# Patient Record
Sex: MALE | Race: WHITE | NOT HISPANIC OR LATINO | Employment: OTHER | URBAN - METROPOLITAN AREA
[De-identification: names, ages, dates, MRNs, and addresses within clinical notes are randomized per-mention and may not be internally consistent; named-entity substitution may affect disease eponyms.]

---

## 2017-01-10 ENCOUNTER — HOSPITAL ENCOUNTER (OUTPATIENT)
Dept: LAB | Facility: MEDICAL CENTER | Age: 67
End: 2017-01-10
Attending: INTERNAL MEDICINE
Payer: MEDICARE

## 2017-01-10 DIAGNOSIS — N18.3 CKD (CHRONIC KIDNEY DISEASE), STAGE 3 (MODERATE): Chronic | ICD-10-CM

## 2017-01-10 LAB
ANION GAP SERPL CALC-SCNC: 6 MMOL/L (ref 0–11.9)
BUN SERPL-MCNC: 25 MG/DL (ref 8–22)
CALCIUM SERPL-MCNC: 9.3 MG/DL (ref 8.5–10.5)
CHLORIDE SERPL-SCNC: 110 MMOL/L (ref 96–112)
CO2 SERPL-SCNC: 25 MMOL/L (ref 20–33)
CREAT SERPL-MCNC: 1.46 MG/DL (ref 0.5–1.4)
GLUCOSE SERPL-MCNC: 107 MG/DL (ref 65–99)
POTASSIUM SERPL-SCNC: 4.3 MMOL/L (ref 3.6–5.5)
SODIUM SERPL-SCNC: 141 MMOL/L (ref 135–145)

## 2017-01-10 PROCEDURE — 80048 BASIC METABOLIC PNL TOTAL CA: CPT

## 2017-01-10 PROCEDURE — 36415 COLL VENOUS BLD VENIPUNCTURE: CPT

## 2017-01-11 ENCOUNTER — OFFICE VISIT (OUTPATIENT)
Dept: NEPHROLOGY | Facility: MEDICAL CENTER | Age: 67
End: 2017-01-11
Payer: MEDICARE

## 2017-01-11 VITALS
TEMPERATURE: 97.4 F | HEART RATE: 64 BPM | WEIGHT: 220 LBS | HEIGHT: 74 IN | DIASTOLIC BLOOD PRESSURE: 68 MMHG | RESPIRATION RATE: 12 BRPM | BODY MASS INDEX: 28.23 KG/M2 | SYSTOLIC BLOOD PRESSURE: 116 MMHG

## 2017-01-11 DIAGNOSIS — I10 ESSENTIAL HYPERTENSION: Chronic | ICD-10-CM

## 2017-01-11 DIAGNOSIS — N18.3 CKD (CHRONIC KIDNEY DISEASE), STAGE 3 (MODERATE): Chronic | ICD-10-CM

## 2017-01-11 PROCEDURE — 99214 OFFICE O/P EST MOD 30 MIN: CPT | Performed by: INTERNAL MEDICINE

## 2017-01-11 ASSESSMENT — ENCOUNTER SYMPTOMS
FEVER: 0
HYPERTENSION: 1
MYALGIAS: 0
NAUSEA: 0
VOMITING: 0
SHORTNESS OF BREATH: 0
HEADACHES: 0

## 2017-01-11 NOTE — PROGRESS NOTES
"Subjective:      Wilmer Flores is a 66 y.o. male who presents with Hypertension and Chronic Kidney Disease          Hypertension  This is a chronic problem. The current episode started more than 1 year ago. The problem is unchanged. The problem is controlled. Pertinent negatives include no chest pain, headaches, peripheral edema or shortness of breath. There are no associated agents to hypertension. Risk factors for coronary artery disease include male gender, obesity and dyslipidemia. Past treatments include calcium channel blockers. The current treatment provides significant improvement. There are no compliance problems.  Hypertensive end-organ damage includes kidney disease. Identifiable causes of hypertension include chronic renal disease.   Chronic Kidney Disease  This is a chronic problem. The current episode started more than 1 year ago. The problem occurs constantly. The problem has been gradually worsening. Pertinent negatives include no chest pain, fever, headaches, myalgias, nausea, urinary symptoms or vomiting.       Review of Systems   Constitutional: Negative for fever.   Eyes:        Patient is blind   Respiratory: Negative for shortness of breath.    Cardiovascular: Negative for chest pain and leg swelling.   Gastrointestinal: Negative for nausea and vomiting.   Genitourinary: Negative for dysuria, urgency and frequency.   Musculoskeletal: Negative for myalgias.   Neurological: Negative for headaches.          Objective:     /68 mmHg  Pulse 64  Temp(Src) 36.3 °C (97.4 °F) (Temporal)  Resp 12  Ht 1.88 m (6' 2\")  Wt 99.791 kg (220 lb)  BMI 28.23 kg/m2     Physical Exam   Constitutional: He appears well-developed and well-nourished.   HENT:   Head: Atraumatic.   Nose: Nose normal.   Eyes: Right eye exhibits no discharge. Left eye exhibits no discharge. No scleral icterus.   Neck: No JVD present.   Cardiovascular: Normal rate and regular rhythm.  Exam reveals no friction rub.    No murmur " heard.  Pulmonary/Chest: Effort normal. No respiratory distress. He has no wheezes. He has no rales.   Abdominal: Soft.   Musculoskeletal: He exhibits no edema.   Neurological: He is alert.   Patient is blind   Skin: Skin is warm. He is not diaphoretic.   Psychiatric: He has a normal mood and affect. His behavior is normal.   Nursing note and vitals reviewed.              Assessment/Plan:     1. Essential hypertension  Controlled  Continue low-salt diet    2. CKD (chronic kidney disease), stage 3 (moderate)  Stable  Renal dose on medication  Avoid nephrotoxins  Check labs annually

## 2017-01-12 ENCOUNTER — TELEPHONE (OUTPATIENT)
Dept: MEDICAL GROUP | Facility: PHYSICIAN GROUP | Age: 67
End: 2017-01-12

## 2017-01-12 NOTE — TELEPHONE ENCOUNTER
----- Message from Kia Patel D.O. sent at 1/12/2017 10:16 AM PST -----  Renal function improved on labwork

## 2017-01-26 ENCOUNTER — OFFICE VISIT (OUTPATIENT)
Dept: NEUROLOGY | Facility: MEDICAL CENTER | Age: 67
End: 2017-01-26
Payer: MEDICARE

## 2017-01-26 VITALS
HEART RATE: 57 BPM | BODY MASS INDEX: 28.23 KG/M2 | OXYGEN SATURATION: 95 % | SYSTOLIC BLOOD PRESSURE: 110 MMHG | WEIGHT: 220 LBS | RESPIRATION RATE: 16 BRPM | DIASTOLIC BLOOD PRESSURE: 60 MMHG | TEMPERATURE: 97.5 F | HEIGHT: 74 IN

## 2017-01-26 DIAGNOSIS — R56.9 CONVULSIONS, UNSPECIFIED CONVULSION TYPE (HCC): ICD-10-CM

## 2017-01-26 PROCEDURE — G8420 CALC BMI NORM PARAMETERS: HCPCS | Performed by: PSYCHIATRY & NEUROLOGY

## 2017-01-26 PROCEDURE — G8484 FLU IMMUNIZE NO ADMIN: HCPCS | Performed by: PSYCHIATRY & NEUROLOGY

## 2017-01-26 PROCEDURE — 3017F COLORECTAL CA SCREEN DOC REV: CPT | Performed by: PSYCHIATRY & NEUROLOGY

## 2017-01-26 PROCEDURE — 1036F TOBACCO NON-USER: CPT | Performed by: PSYCHIATRY & NEUROLOGY

## 2017-01-26 PROCEDURE — G8432 DEP SCR NOT DOC, RNG: HCPCS | Performed by: PSYCHIATRY & NEUROLOGY

## 2017-01-26 PROCEDURE — 99214 OFFICE O/P EST MOD 30 MIN: CPT | Performed by: PSYCHIATRY & NEUROLOGY

## 2017-01-26 PROCEDURE — 4040F PNEUMOC VAC/ADMIN/RCVD: CPT | Performed by: PSYCHIATRY & NEUROLOGY

## 2017-01-26 PROCEDURE — 1101F PT FALLS ASSESS-DOCD LE1/YR: CPT | Performed by: PSYCHIATRY & NEUROLOGY

## 2017-01-26 RX ORDER — TOPIRAMATE 100 MG/1
100 TABLET, FILM COATED ORAL DAILY
Qty: 90 TAB | Refills: 3 | Status: SHIPPED | OUTPATIENT
Start: 2017-01-26 | End: 2024-02-12

## 2017-01-26 ASSESSMENT — ENCOUNTER SYMPTOMS
TINGLING: 1
TREMORS: 0
FOCAL WEAKNESS: 1
SENSORY CHANGE: 1
LOSS OF CONSCIOUSNESS: 0
BACK PAIN: 1
SEIZURES: 1

## 2017-01-26 NOTE — MR AVS SNAPSHOT
"        Wilmer Flores   2017 3:00 PM   Office Visit   MRN: [id number]    Department:  [facility]   Dept Phone:  [phone]    Description:  Male : 1950   Provider:  Dony Isbell M.D.           Reason for Visit     New Patient seizure      Allergies as of 2017     Allergen Noted Reactions    Food 2013       Allergic to strawberries    Catapres [Clonidine Hcl] 10/05/2011       Demerol 10/05/2011       Morphine 10/05/2011       Sulfa Drugs 10/05/2011         You were diagnosed with     Convulsions, unspecified convulsion type (CMS-[facility])   [[id number]]         Vital Signs     Blood Pressure Pulse Temperature Respirations Height Weight    110/60 mmHg 57 36.4 °C (97.5 °F) 16 1.88 m (6' 2\") 99.791 kg (220 lb)    Body Mass Index Oxygen Saturation Smoking Status             28.23 kg/m2 95% Former Smoker         Basic Information     Date Of Birth Sex Race Ethnicity Preferred Language    1950 Male White Non- English      Your appointments     Mar 20, 2017  3:45 PM   Established Patient with Kia Patel D.O.   [facility]    [address], [address]  [place] [facility] [place] [address] [phone]           You will be receiving a confirmation call a few days before your appointment from our automated call confirmation system.            2018  3:00 PM   Follow Up Visit with Dony Isbell M.D.   [facility] (--)    [address], [address]  [place] [place] [address] [phone]           You will be receiving a confirmation call a few days before your appointment from our automated call confirmation system.              Problem List              ICD-10-CM Priority Class Noted - Resolved    Hypertension (Chronic) I10   10/5/2011 - Present    Hypertriglyceridemia (Chronic) E78.1   10/5/2011 - Present    Neck pain, chronic (Chronic) M54.2, G89.29   10/5/2011 - Present    Chronic lower back pain (Chronic) M54.5, G89.29   " 10/5/2011 - Present    RLS (restless legs syndrome) (Chronic) G25.81   10/5/2011 - Present    CKD (chronic kidney disease) (Chronic) N18.9   10/5/2011 - Present    Blindness H54.0   10/5/2011 - Present    Routine health maintenance Z00.00   10/5/2011 - Present    Vitamin D deficiency E55.9   11/9/2011 - Present    Convulsion (CMS-HCC) R56.9   3/7/2013 - Present    Voice hoarseness R49.0   10/10/2013 - Present    Impaired fasting blood sugar R73.01   12/3/2013 - Present    Chronic nausea R11.0   1/15/2014 - Present    Paresthesias R20.2   2/25/2016 - Present    Melanoma (CMS-HCC) C43.9   6/6/2016 - Present      Health Maintenance        Date Due Completion Dates    IMM DTaP/Tdap/Td Vaccine (1 - Tdap) 1/29/1969 ---    IMM ZOSTER VACCINE 1/29/2010 ---    IMM PNEUMOCOCCAL 65+ (ADULT) LOW/MEDIUM RISK SERIES (1 of 2 - PCV13) 1/29/2015 3/8/2009    IMM INFLUENZA (1) 9/1/2016 ---    COLONOSCOPY 10/23/2019 10/23/2014, 1/3/2014            Current Immunizations     Pneumococcal polysaccharide vaccine (PPSV-23) 3/8/2009      Below and/or attached are the medications your provider expects you to take. Review all of your home medications and newly ordered medications with your provider and/or pharmacist. Follow medication instructions as directed by your provider and/or pharmacist. Please keep your medication list with you and share with your provider. Update the information when medications are discontinued, doses are changed, or new medications (including over-the-counter products) are added; and carry medication information at all times in the event of emergency situations     Allergies:  FOOD - (reactions not documented)     CATAPRES - (reactions not documented)     DEMEROL - (reactions not documented)     MORPHINE - (reactions not documented)     SULFA DRUGS - (reactions not documented)               Medications  Valid as of: January 26, 2017 -  3:35 PM    Generic Name Brand Name Tablet Size Instructions for use    AmLODIPine  Besylate (Tab) NORVASC 10 MG TAKE 1 TABLET BY MOUTH DAILY        Ascorbic Acid (Tab) ascorbic acid 500 MG Take 500 mg by mouth every day.        Aspirin (Chew Tab) ASA 81 MG Take 81 mg by mouth every day.        B Complex-C   Take  by mouth.        Cholecalciferol (Tab) Vitamin D-3 5000 UNITS Take  by mouth every day.        Fenofibrate (Tab) TRICOR 145 MG TAKE 1 TABLET BY MOUTH EVERY DAY        LORazepam (Tab) ATIVAN 0.5 MG Take 1 Tab by mouth every four hours as needed for Anxiety.        Lovastatin (Tab) MEVACOR 40 MG TAKE 1 TABLET BY MOUTH EVERY DAY        Multiple Vitamins-Minerals   Take  by mouth.        Nebivolol HCl (Tab) BYSTOLIC 10 MG TAKE 1 TABLET BY MOUTH EVERY DAY        Omega-3 Fatty Acids (Cap) fish oil 1000 MG Take 1,000 mg by mouth 3 times a day, with meals.        OxyCODONE HCl (Tab) OXY-IR 15 MG Take 30 mg by mouth 2 Times a Day.        OxyCODONE HCl (Tablet Extended Release 12 hour Abuse-Deterrent) OXYCONTIN 60 MG Take 30 mg by mouth 4 times a day.        Pantoprazole Sodium   Take  by mouth.        Pregabalin   Take 150 mg by mouth 4 times a day.        Prochlorperazine Maleate (Tab) COMPAZINE 10 MG Take 10 mg by mouth 2 times a day as needed.        Psyllium   Take  by mouth.        ROPINIRole HCl (Tab) REQUIP 0.5 MG TAKE 1 TABLET BY MOUTH THREE TIMES DAILY        TiaGABine HCl (Tab) GABITRIL 4 MG Take 4 mg by mouth 3 times a day.        TiZANidine HCl (Tab) ZANAFLEX 4 MG Take 1 Tab by mouth 4 times a day.        Topiramate (Tab) TOPAMAX 100 MG Take 1 Tab by mouth every day.        .                 Medicines prescribed today were sent to:     [id number] - [place], [place] - [address] AT [facility] [address]    [address] [place] [address]    Phone: [phone] Fax: [phone]    Open 24 Hours?: No    [facility] - [place], [place] - [address]    [address] [address]    Phone: [phone] Fax:  [phone]    Open 24 Hours?: No      Medication refill instructions:       If your prescription bottle indicates you have medication refills left, it is not necessary to call your provider’s office. Please contact your pharmacy and they will refill your medication.    If your prescription bottle indicates you do not have any refills left, you may request refills at any time through one of the following ways: The online [employer] system (except Urgent Care), by calling your provider’s office, or by asking your pharmacy to contact your provider’s office with a refill request. Medication refills are processed only during regular business hours and may not be available until the next business day. Your provider may request additional information or to have a follow-up visit with you prior to refilling your medication.   *Please Note: Medication refills are assigned a new Rx number when refilled electronically. Your pharmacy may indicate that no refills were authorized even though a new prescription for the same medication is available at the pharmacy. Please request the medicine by name with the pharmacy before contacting your provider for a refill.        Other Notes About Your Plan     Dr Connell -- GI  Dr Steen -- Pain  Dr Varghese -- ENT  Dr Hanna -- Neurology  Dr Najjar -- nephrology           [facility] Status: Patient Declined

## 2017-01-26 NOTE — PROGRESS NOTES
Subjective:      Wilmer Flores is a 66 y.o. male who presents with his wife Leatha, for follow-up, former patient of Dr. Mueller, with a history of nonepileptic seizures and axonal sensorimotor polyneuropathy with symptomatic RLS.     HPI    Seizures: Wilmer continues to have his events once or twice in a month. Typically during the daytime hours, there has been no change in this pattern, though he has noted that he his pain level increases, he is more susceptible. There always the same type of dizziness, sweatiness, altered sensorium, grunting sounds, grading of teeth and bilateral upper extremity tonic posturing. One of the events occurred while he was in the office with Dr. Mueller, he was still awake and alert, could answer questions appropriately. Ambulatory EEG study also failed to demonstrate any epileptiform activity seen in association with episodes of diaphoresis and dizziness with some confusion. He remains on Topamax 25 mg, 4 times a day, with good tolerability. Given his severe visual disability, his wife is responsible for his medications.    Neuropathy: He did undergo EMG/NCV studies of the lower extremities, these demonstrated an axonal sensorimotor polyneuropathy, there was no clear demonstration of an active radiculopathy. He still has the numbness and tingling in the feet, still below the ankles. He describes discomfort, he also is now working with a foot drop, symptoms more on the left side. He has some loss of sensation in the hands, but none of the painful sensory distortions. Things seem to have moved upwards and become more intense slowly and steadily. He has been placed on Lyrica 150 mg, 4 times a day, already on Gabitril 4 mg, 3 times a day, and OxyContin. Zanaflex has helped to a degree with the spasms themselves.    RLS: Symptomatic of the neuropathy, he is on Requip 0.5 mg, 3 times a day, with good tolerability and efficacy.    Medical, surgical and family histories are reviewed, there are no  "new drug allergies. He is on a long list of medications, from my standpoint including his Topamax, Requip, Lyrica, Gabitril, Zanaflex, oxycodone, and also Norvasc, Mevacor, Tri-Chlor, by systolic, baby aspirin daily, Ativan, and a list of vitamin and mineral supplements.    Review of Systems   Constitutional: Negative for malaise/fatigue.   Musculoskeletal: Positive for back pain.   Neurological: Positive for tingling, sensory change, focal weakness and seizures. Negative for tremors and loss of consciousness.   All other systems reviewed and are negative.       Objective:     /60 mmHg  Pulse 57  Temp(Src) 36.4 °C (97.5 °F)  Resp 16  Ht 1.88 m (6' 2\")  Wt 99.791 kg (220 lb)  BMI 28.23 kg/m2  SpO2 95%     Physical Exam    He appears in no acute distress. His vital signs are stable. There is no malar rash. Both corneas are opacified, the right eye externally deviated. Dentition is poor but there is no active periodontal disease. The neck is supple. Carotid pulses are present without asymmetry. There are some mild chronic vascular insufficiency changes in the distal lower extremities, there is only minimal pretibial edema.    He is fully oriented, there is no evidence of gross language or cognitive deficit. There is no facial asymmetry, sensory loss across the midline, the tongue and uvula are midline. Strength assessment does reveal some weakness of grasp, both plantar flexion and dorsiflexion is well in both feet, the left more than the right. Knee movement as well as hip stabilizers are intact. There is no weakness in the proximal upper extremity. Reflexes are present at the knees and ankles as well as the biceps without asymmetries. The toes are downgoing. Repetitive movements are slowed in the feet, proportionate to the degree of weakness. These are intact with the hands. Sensory exam does reveal a stocking loss of vibration to the shins bilaterally, graded loss of temperature above the ankles " bilaterally. These modalities are intact in the upper extremities.     Assessment/Plan:     1. Convulsions, unspecified convulsion type (CMS-HCC)  Stable, Topamax will be continued but I will make it easier, 100 mg every morning. We reviewed the nature of the type of seizures that he has (nonepileptic), how they can differ from epileptic seizures and thus why they are treated differently, why elevated pain levels may in fact trigger them, and that they will not necessarily worsen, are not indicative of any intrinsic structural pathology in the brain. He will need low-dose medication for the rest of his life.    - topiramate (TOPAMAX) 100 MG Tab; Take 1 Tab by mouth every day.  Dispense: 90 Tab; Refill: 3    2. Neuropathy  Stable on a constellation/cocktail of medication through his pain specialist office including Lyrica, Gabitril, narcotics and Zanaflex.    3. RLS  Symptomatic of the neuropathy, he will need ropinirole lifelong, hopefully we will not see augmentation in which symptoms worsen earlier in the day, and also an increase in severity with not just the legs but now the entire body and upper extremities getting involved.    Face-to-face time was spent reviewing all the above. I can follow up within one year.    Time: Evaluation of 40 minutes for exam, review, discussion, and education  Discussion: As mentioned in the assessment, over 60% of the time spent face-to-face with the patient and his wife counseling and coordinating care.

## 2017-02-01 RX ORDER — NEBIVOLOL HYDROCHLORIDE 10 MG/1
TABLET ORAL
Qty: 90 TAB | Refills: 0 | Status: SHIPPED | OUTPATIENT
Start: 2017-02-01 | End: 2017-03-30

## 2017-03-15 RX ORDER — FENOFIBRATE 145 MG/1
TABLET, COATED ORAL
Qty: 90 TAB | Refills: 0 | Status: SHIPPED | OUTPATIENT
Start: 2017-03-15 | End: 2017-06-15 | Stop reason: SDUPTHER

## 2017-03-27 ENCOUNTER — HOSPITAL ENCOUNTER (OUTPATIENT)
Dept: LAB | Facility: MEDICAL CENTER | Age: 67
End: 2017-03-27
Attending: INTERNAL MEDICINE
Payer: MEDICARE

## 2017-03-27 DIAGNOSIS — R73.01 IMPAIRED FASTING BLOOD SUGAR: ICD-10-CM

## 2017-03-27 DIAGNOSIS — E55.9 VITAMIN D DEFICIENCY: ICD-10-CM

## 2017-03-27 DIAGNOSIS — N18.3 CKD (CHRONIC KIDNEY DISEASE), STAGE 3 (MODERATE): Chronic | ICD-10-CM

## 2017-03-27 LAB
25(OH)D3 SERPL-MCNC: 26 NG/ML (ref 30–100)
ALBUMIN SERPL BCP-MCNC: 4.4 G/DL (ref 3.2–4.9)
ALBUMIN/GLOB SERPL: 1.1 G/DL
ALP SERPL-CCNC: 35 U/L (ref 30–99)
ALT SERPL-CCNC: 15 U/L (ref 2–50)
ANION GAP SERPL CALC-SCNC: 8 MMOL/L (ref 0–11.9)
AST SERPL-CCNC: 16 U/L (ref 12–45)
BILIRUB SERPL-MCNC: 0.6 MG/DL (ref 0.1–1.5)
BUN SERPL-MCNC: 23 MG/DL (ref 8–22)
CALCIUM SERPL-MCNC: 10 MG/DL (ref 8.5–10.5)
CHLORIDE SERPL-SCNC: 106 MMOL/L (ref 96–112)
CO2 SERPL-SCNC: 25 MMOL/L (ref 20–33)
CREAT SERPL-MCNC: 1.68 MG/DL (ref 0.5–1.4)
EST. AVERAGE GLUCOSE BLD GHB EST-MCNC: 123 MG/DL
GLOBULIN SER CALC-MCNC: 3.9 G/DL (ref 1.9–3.5)
GLUCOSE SERPL-MCNC: 132 MG/DL (ref 65–99)
HBA1C MFR BLD: 5.9 % (ref 0–5.6)
POTASSIUM SERPL-SCNC: 4.2 MMOL/L (ref 3.6–5.5)
PROT SERPL-MCNC: 8.3 G/DL (ref 6–8.2)
SODIUM SERPL-SCNC: 139 MMOL/L (ref 135–145)

## 2017-03-27 PROCEDURE — 83036 HEMOGLOBIN GLYCOSYLATED A1C: CPT | Mod: GA

## 2017-03-27 PROCEDURE — 80053 COMPREHEN METABOLIC PANEL: CPT

## 2017-03-27 PROCEDURE — 36415 COLL VENOUS BLD VENIPUNCTURE: CPT | Mod: GA

## 2017-03-27 PROCEDURE — 82306 VITAMIN D 25 HYDROXY: CPT

## 2017-03-30 ENCOUNTER — OFFICE VISIT (OUTPATIENT)
Dept: MEDICAL GROUP | Facility: PHYSICIAN GROUP | Age: 67
End: 2017-03-30
Payer: MEDICARE

## 2017-03-30 VITALS
OXYGEN SATURATION: 91 % | TEMPERATURE: 96.7 F | DIASTOLIC BLOOD PRESSURE: 60 MMHG | HEIGHT: 74 IN | HEART RATE: 56 BPM | RESPIRATION RATE: 16 BRPM | SYSTOLIC BLOOD PRESSURE: 100 MMHG | BODY MASS INDEX: 28.23 KG/M2 | WEIGHT: 220 LBS

## 2017-03-30 DIAGNOSIS — N18.3 CKD (CHRONIC KIDNEY DISEASE), STAGE 3 (MODERATE): Chronic | ICD-10-CM

## 2017-03-30 DIAGNOSIS — R73.01 IMPAIRED FASTING BLOOD SUGAR: ICD-10-CM

## 2017-03-30 DIAGNOSIS — R56.9 CONVULSIONS, UNSPECIFIED CONVULSION TYPE (HCC): ICD-10-CM

## 2017-03-30 DIAGNOSIS — I10 ESSENTIAL HYPERTENSION: Chronic | ICD-10-CM

## 2017-03-30 PROCEDURE — G8419 CALC BMI OUT NRM PARAM NOF/U: HCPCS | Performed by: FAMILY MEDICINE

## 2017-03-30 PROCEDURE — 1101F PT FALLS ASSESS-DOCD LE1/YR: CPT | Performed by: FAMILY MEDICINE

## 2017-03-30 PROCEDURE — 4040F PNEUMOC VAC/ADMIN/RCVD: CPT | Performed by: FAMILY MEDICINE

## 2017-03-30 PROCEDURE — G8484 FLU IMMUNIZE NO ADMIN: HCPCS | Performed by: FAMILY MEDICINE

## 2017-03-30 PROCEDURE — G8432 DEP SCR NOT DOC, RNG: HCPCS | Performed by: FAMILY MEDICINE

## 2017-03-30 PROCEDURE — 1036F TOBACCO NON-USER: CPT | Performed by: FAMILY MEDICINE

## 2017-03-30 PROCEDURE — 99214 OFFICE O/P EST MOD 30 MIN: CPT | Performed by: FAMILY MEDICINE

## 2017-03-30 NOTE — PROGRESS NOTES
Subjective:     Chief Complaint   Patient presents with   • Results       Wilmer Flores is a 67 y.o. male here today for follow up on HTN, IFG, and CKD    Elevated A1C: Pt reports change in diet recently.  Pt states he gained 15lbs, but is now loosing weight.     Pt is taking ativan for onset of prodrome to seizure.  Pt reports seizures with extreme pain.     Hypertension: Patient is taking all medications as directed without side effects. Denies headaches, chest pain, change in urination or lower extremity swelling.      Blindness,   Allergies   Allergen Reactions   • Food      Allergic to strawberries   • Catapres [Clonidine Hcl]    • Demerol    • Morphine    • Sulfa Drugs      Current medicines (including changes today)  Current Outpatient Prescriptions   Medication Sig Dispense Refill   • metoprolol (LOPRESSOR) 25 MG Tab Take 1 Tab by mouth 2 times a day. 60 Tab 3   • fenofibrate (TRICOR) 145 MG Tab TAKE 1 TABLET BY MOUTH EVERY DAY 90 Tab 0   • PANTOPRAZOLE SODIUM PO Take  by mouth.     • topiramate (TOPAMAX) 100 MG Tab Take 1 Tab by mouth every day. 90 Tab 3   • amlodipine (NORVASC) 10 MG Tab TAKE 1 TABLET BY MOUTH DAILY 90 Tab 3   • lovastatin (MEVACOR) 40 MG tablet TAKE 1 TABLET BY MOUTH EVERY DAY 90 Tab 3   • ropinirole (REQUIP) 0.5 MG Tab TAKE 1 TABLET BY MOUTH THREE TIMES DAILY 270 Tab 3   • Omega-3 Fatty Acids (FISH OIL) 1000 MG Cap capsule Take 1,000 mg by mouth 3 times a day, with meals.     • B Complex-C (SUPER B COMPLEX PO) Take  by mouth.     • ascorbic acid (ASCORBIC ACID) 500 MG Tab Take 500 mg by mouth every day.     • aspirin (ASA) 81 MG Chew Tab chewable tablet Take 81 mg by mouth every day.     • tiagabine (GABITRIL) 4 MG TABS Take 4 mg by mouth 3 times a day.     • oxyCODONE HCl CR (OXYCONTIN) 60 MG T12A tablet Take 30 mg by mouth 4 times a day.     • Cholecalciferol (VITAMIN D-3) 5000 UNITS TABS Take  by mouth every day.     • Multiple Vitamins-Minerals (MULTIVITAMIN PO) Take  by mouth.      • Pregabalin (LYRICA PO) Take 150 mg by mouth 4 times a day.     • lorazepam (ATIVAN) 0.5 MG TABS Take 1 Tab by mouth every four hours as needed for Anxiety. (Patient taking differently: Take 0.5 mg by mouth 1 time daily as needed for Anxiety.) 20 Each 0   • oxycodone (OXY-IR) 15 MG immediate release tablet Take 30 mg by mouth 2 Times a Day.     • Psyllium (METAMUCIL PO) Take  by mouth.     • prochlorperazine (COMPAZINE) 10 MG TABS Take 10 mg by mouth 2 times a day as needed.       No current facility-administered medications for this visit.     Social History   Substance Use Topics   • Smoking status: Former Smoker -- 1.00 packs/day for 10 years     Types: Cigarettes     Quit date: 2009   • Smokeless tobacco: Former User     Quit date: 2003      Comment: quit long time ago /used to smoke pack a day for 10 years   • Alcohol Use: No     Family Status   Relation Status Death Age   • Father     • Brother Alive    • Mother       Family History   Problem Relation Age of Onset   • Diabetes Father    • Stroke Father    • Cancer Brother      pancreatic cacner   • Cancer Mother      ovarian     He    has a past medical history of Hypertension (10/5/2011); Hyperlipidemia (10/5/2011); Hypertriglyceridemia (10/5/2011); RLS (restless legs syndrome) (10/5/2011); Blindness - both eyes (10/5/2011); Unspecified urinary incontinence; Arthritis; CATARACT; Glaucoma; Seizure (CMS-HCC); Cancer (CMS-HCC); CKD (chronic kidney disease) (10/5/2011); Kidney disease; Other specified disorder of intestines; Pneumonia; Neck pain, chronic (10/5/2011); Chronic lower back pain (10/5/2011); Pain (1/10/14); and Melanoma in situ of neck (CMS-HCC).        ROS   GEN: no weight loss, fevers, or chills  HEENT:bilat blindness, no ear pain, no difficulty swallowing, no throat pain, no runny nose, no nasal congestion  Resp: no shortness of breath, no cough  CV: no racing heart, no irregular beats, no chest pain  Abd: no nausea,  "no vomiting, no diarrhea, no constipation, no blood in stool, no dark stools, no incontinence  : no dysuria, no hematuria, no urinary incontinence, no increased frequency  MSK: chronic neck and low back pain  Neuro: no headaches, no dizziness, no LOC, no weakness, no numbness/tingling       Objective:     Blood pressure 100/60, pulse 56, temperature 35.9 °C (96.7 °F), resp. rate 16, height 1.88 m (6' 2.02\"), weight 99.791 kg (220 lb), SpO2 91 %. Body mass index is 28.23 kg/(m^2).     Physical Exam:  Constitutional: Alert, no distress.  Skin: Warm, dry, good turgor, no rashes in visible areas.  ENMT: Lips without lesions, good dentition, oropharynx non-erythematous, no exudate, moist oral mucosa  Neck: Trachea midline, no masses, no thyromegaly. No cervical or supraclavicular lymphadenopathy. Full ROM  Respiratory: Unlabored respiratory effort, good air movement, lungs clear to auscultation, no wheezes, no ronchi.  Cardiovascular:RRR, +S1, S2, no murmur, no peripheral edema, pedal and radial pulses equal and intact bilat  Abdomen: Soft, non-tender, no masses, no hepatosplenomegaly.  Psych: Alert and oriented x3, appropriate affect and mood.  Neuro: CN2-12 intact, no gross motor or sensory deficits      Assessment and Plan:   The following treatment plan was discussed    1. Impaired fasting blood sugar  A1C 5.9. We advise to reduce sugar/carbohydrate/alcohol, loose weight, eat more vegetables and lean meats such as fish/chicken/turkey. We also recommend 30 minutes of cardiovascular exercise 5 days of the week. Improved from 6.2 in Nov.     2. Essential hypertension  Chronic: well controlled  Pt requests change in medication d/t cost of [facility].   Patient will take blood pressures daily. If blood pressures are consistently greater than 140/90 patient is to  to call us immediately. If  blood pressures are greater than 180/100 pt to go to ER immediately.  - metoprolol (LOPRESSOR) 25 MG Tab; Take 1 Tab by mouth 2 times " a day.  Dispense: 60 Tab; Refill: 3    3. CKD (chronic kidney disease), stage 3 (moderate)  Chronic: BUN, Cr, and GFR stable.  Will continue to follow. Glucose and HTN controlled    4. Convulsions, unspecified convulsion type (CMS-HCC)  No recent seizure activity.       Followup: Return in about 3 months (around 6/30/2017) for F/U HTN.    Please note that this dictation was created using voice recognition software. I have made every reasonable attempt to correct obvious errors, but I expect that there are errors of grammar and possibly content that I did not discover before finalizing the note.

## 2017-03-30 NOTE — MR AVS SNAPSHOT
"        Wilmer Flores   3/30/2017 3:00 PM   Office Visit   MRN: [id number]    Department:  [facility]   Dept Phone:  [phone]    Description:  Male : 1950   Provider:  Caitlin Soni D.O.           Reason for Visit     Results           Allergies as of 3/30/2017     Allergen Noted Reactions    Food 2013       Allergic to strawberries    Catapres [Clonidine Hcl] 10/05/2011       Demerol 10/05/2011       Morphine 10/05/2011       Sulfa Drugs 10/05/2011         You were diagnosed with     Impaired fasting blood sugar   [198864]       CKD (chronic kidney disease), stage 3 (moderate)   [3741998]       Essential hypertension   [1223293]         Vital Signs     Blood Pressure Pulse Temperature Respirations Height Weight    100/60 mmHg 56 35.9 °C (96.7 °F) 16 1.88 m (6' 2.02\") 99.791 kg (220 lb)    Body Mass Index Oxygen Saturation Smoking Status             28.23 kg/m2 91% Former Smoker         Basic Information     Date Of Birth Sex Race Ethnicity Preferred Language    1950 Male White Non- English      Your appointments     2018  3:00 PM   Follow Up Visit with Dony Isbell M.D.   [facility] (--)    [address] [place] [address], [address]  [facility] [address] [phone]           You will be receiving a confirmation call a few days before your appointment from our automated call confirmation system.              Problem List              ICD-10-CM Priority Class Noted - Resolved    Hypertension (Chronic) I10   10/5/2011 - Present    Hypertriglyceridemia (Chronic) E78.1   10/5/2011 - Present    Neck pain, chronic (Chronic) M54.2, G89.29   10/5/2011 - Present    Chronic lower back pain (Chronic) M54.5, G89.29   10/5/2011 - Present    RLS (restless legs syndrome) (Chronic) G25.81   10/5/2011 - Present    CKD (chronic kidney disease) (Chronic) N18.9   10/5/2011 - Present    Blindness H54.0   10/5/2011 - Present    Routine health maintenance Z00.00   10/5/2011 - " Present    Vitamin D deficiency E55.9   11/9/2011 - Present    Convulsion (CMS-HCC) R56.9   3/7/2013 - Present    Voice hoarseness R49.0   10/10/2013 - Present    Impaired fasting blood sugar R73.01   12/3/2013 - Present    Chronic nausea R11.0   1/15/2014 - Present    Paresthesias R20.2   2/25/2016 - Present    Melanoma (CMS-HCC) C43.9   6/6/2016 - Present      Health Maintenance        Date Due Completion Dates    IMM DTaP/Tdap/Td Vaccine (1 - Tdap) 1/29/1969 ---    IMM ZOSTER VACCINE 1/29/2010 ---    IMM PNEUMOCOCCAL 65+ (ADULT) LOW/MEDIUM RISK SERIES (1 of 2 - PCV13) 1/29/2015 3/8/2009    IMM INFLUENZA (1) 9/1/2016 ---    COLONOSCOPY 10/23/2019 10/23/2014, 1/3/2014            Current Immunizations     Pneumococcal polysaccharide vaccine (PPSV-23) 3/8/2009      Below and/or attached are the medications your provider expects you to take. Review all of your home medications and newly ordered medications with your provider and/or pharmacist. Follow medication instructions as directed by your provider and/or pharmacist. Please keep your medication list with you and share with your provider. Update the information when medications are discontinued, doses are changed, or new medications (including over-the-counter products) are added; and carry medication information at all times in the event of emergency situations     Allergies:  FOOD - (reactions not documented)     CATAPRES - (reactions not documented)     DEMEROL - (reactions not documented)     MORPHINE - (reactions not documented)     SULFA DRUGS - (reactions not documented)               Medications  Valid as of: March 30, 2017 -  4:24 PM    Generic Name Brand Name Tablet Size Instructions for use    AmLODIPine Besylate (Tab) NORVASC 10 MG TAKE 1 TABLET BY MOUTH DAILY        Ascorbic Acid (Tab) ascorbic acid 500 MG Take 500 mg by mouth every day.        Aspirin (Chew Tab) ASA 81 MG Take 81 mg by mouth every day.        B Complex-C   Take  by mouth.         Cholecalciferol (Tab) Vitamin D-3 5000 UNITS Take  by mouth every day.        Fenofibrate (Tab) TRICOR 145 MG TAKE 1 TABLET BY MOUTH EVERY DAY        LORazepam (Tab) ATIVAN 0.5 MG Take 1 Tab by mouth every four hours as needed for Anxiety.        Lovastatin (Tab) MEVACOR 40 MG TAKE 1 TABLET BY MOUTH EVERY DAY        Metoprolol Tartrate (Tab) LOPRESSOR 25 MG Take 1 Tab by mouth 2 times a day.        Multiple Vitamins-Minerals   Take  by mouth.        Omega-3 Fatty Acids (Cap) fish oil 1000 MG Take 1,000 mg by mouth 3 times a day, with meals.        OxyCODONE HCl (Tab) OXY-IR 15 MG Take 30 mg by mouth 2 Times a Day.        OxyCODONE HCl (Tablet Extended Release 12 hour Abuse-Deterrent) OXYCONTIN 60 MG Take 30 mg by mouth 4 times a day.        Pantoprazole Sodium   Take  by mouth.        Pregabalin   Take 150 mg by mouth 4 times a day.        Prochlorperazine Maleate (Tab) COMPAZINE 10 MG Take 10 mg by mouth 2 times a day as needed.        Psyllium   Take  by mouth.        ROPINIRole HCl (Tab) REQUIP 0.5 MG TAKE 1 TABLET BY MOUTH THREE TIMES DAILY        TiaGABine HCl (Tab) GABITRIL 4 MG Take 4 mg by mouth 3 times a day.        Topiramate (Tab) TOPAMAX 100 MG Take 1 Tab by mouth every day.        .                 Medicines prescribed today were sent to:     [facility] [id number] [place] [address] - [address] AT [facility] OF [facility]. & [place] [id number] [facility] [address]    Phone: [phone] Fax: [phone]    Open 24 Hours?: No    [facility] - [place], [place] - [address] [facility]    [address] [facility] [address]    Phone: [phone] Fax: [phone]    Open 24 Hours?: No      Medication refill instructions:       If your prescription bottle indicates you have medication refills left, it is not necessary to call your provider’s office. Please contact your pharmacy and they will refill your medication.    If your prescription bottle indicates you do not have  any refills left, you may request refills at any time through one of the following ways: The online [employer] system (except Urgent Care), by calling your provider’s office, or by asking your pharmacy to contact your provider’s office with a refill request. Medication refills are processed only during regular business hours and may not be available until the next business day. Your provider may request additional information or to have a follow-up visit with you prior to refilling your medication.   *Please Note: Medication refills are assigned a new Rx number when refilled electronically. Your pharmacy may indicate that no refills were authorized even though a new prescription for the same medication is available at the pharmacy. Please request the medicine by name with the pharmacy before contacting your provider for a refill.        Other Notes About Your Plan     Dr Connell -- GI  Dr Steen -- Pain  Dr Varghese -- ENT  Dr Hanna -- Neurology  Dr Najjar -- nephrology           [facility] Status: Patient Declined

## 2017-04-13 ENCOUNTER — TELEPHONE (OUTPATIENT)
Dept: MEDICAL GROUP | Facility: PHYSICIAN GROUP | Age: 67
End: 2017-04-13

## 2017-04-28 ENCOUNTER — OFFICE VISIT (OUTPATIENT)
Dept: URGENT CARE | Facility: PHYSICIAN GROUP | Age: 67
End: 2017-04-28
Payer: MEDICARE

## 2017-04-28 VITALS
RESPIRATION RATE: 14 BRPM | BODY MASS INDEX: 27.34 KG/M2 | SYSTOLIC BLOOD PRESSURE: 108 MMHG | OXYGEN SATURATION: 94 % | HEART RATE: 84 BPM | TEMPERATURE: 98.6 F | WEIGHT: 213 LBS | DIASTOLIC BLOOD PRESSURE: 64 MMHG

## 2017-04-28 DIAGNOSIS — L03.032 CELLULITIS OF TOE OF LEFT FOOT: ICD-10-CM

## 2017-04-28 PROCEDURE — 99214 OFFICE O/P EST MOD 30 MIN: CPT | Performed by: FAMILY MEDICINE

## 2017-04-28 PROCEDURE — G8419 CALC BMI OUT NRM PARAM NOF/U: HCPCS | Performed by: FAMILY MEDICINE

## 2017-04-28 PROCEDURE — 1036F TOBACCO NON-USER: CPT | Performed by: FAMILY MEDICINE

## 2017-04-28 PROCEDURE — 1101F PT FALLS ASSESS-DOCD LE1/YR: CPT | Performed by: FAMILY MEDICINE

## 2017-04-28 PROCEDURE — G8432 DEP SCR NOT DOC, RNG: HCPCS | Performed by: FAMILY MEDICINE

## 2017-04-28 PROCEDURE — 4040F PNEUMOC VAC/ADMIN/RCVD: CPT | Performed by: FAMILY MEDICINE

## 2017-04-28 RX ORDER — DOXYCYCLINE HYCLATE 100 MG
100 TABLET ORAL 2 TIMES DAILY
Qty: 20 TAB | Refills: 0 | Status: SHIPPED | OUTPATIENT
Start: 2017-04-28 | End: 2017-05-08

## 2017-04-28 NOTE — MR AVS SNAPSHOT
Wilmer Camacholey   2017 5:45 PM   Office Visit   MRN: [id number]    Department:  [facility]   Dept Phone:  [phone]    Description:  Male : 1950   Provider:  Alex Gurrola M.D.           Reason for Visit     Toe Pain poss L infected toe x2 days      Allergies as of 2017     Allergen Noted Reactions    Food 2013       Allergic to strawberries    Catapres [Clonidine Hcl] 10/05/2011       Demerol 10/05/2011       Morphine 10/05/2011       Sulfa Drugs 10/05/2011         You were diagnosed with     Cellulitis of toe of left foot   [606651]         Vital Signs     Blood Pressure Pulse Temperature Respirations Weight Oxygen Saturation    108/64 mmHg 84 37 °C (98.6 °F) 14 96.616 kg (213 lb) 94%    Smoking Status                   Former Smoker           Basic Information     Date Of Birth Sex Race Ethnicity Preferred Language    1950 Male White Non- English      Your appointments     May 26, 2017  2:00 PM   ANNUAL EXAM PREVENTATIVE with DENNIS Pearson   [facility])    [address] [place] [id number] [phone]            2018  3:00 PM   Follow Up Visit with Dony Isbell M.D.   [facility] (--)    [address], [address]  [facility] [address] [phone]           You will be receiving a confirmation call a few days before your appointment from our automated call confirmation system.              Problem List              ICD-10-CM Priority Class Noted - Resolved    Hypertension (Chronic) I10   10/5/2011 - Present    Hypertriglyceridemia (Chronic) E78.1   10/5/2011 - Present    Neck pain, chronic (Chronic) M54.2, G89.29   10/5/2011 - Present    Chronic lower back pain (Chronic) M54.5, G89.29   10/5/2011 - Present    RLS (restless legs syndrome) (Chronic) G25.81   10/5/2011 - Present    CKD (chronic kidney disease) (Chronic) N18.9   10/5/2011 - Present    Blindness H54.0    10/5/2011 - Present    Vitamin D deficiency E55.9   11/9/2011 - Present    Convulsion (CMS-HCC) R56.9   3/7/2013 - Present    Voice hoarseness R49.0   10/10/2013 - Present    Impaired fasting blood sugar R73.01   12/3/2013 - Present    Chronic nausea R11.0   1/15/2014 - Present    Paresthesias R20.2   2/25/2016 - Present    Melanoma (CMS-HCC) C43.9   6/6/2016 - Present      Health Maintenance        Date Due Completion Dates    IMM DTaP/Tdap/Td Vaccine (1 - Tdap) 1/29/1969 ---    IMM ZOSTER VACCINE 1/29/2010 ---    IMM PNEUMOCOCCAL 65+ (ADULT) LOW/MEDIUM RISK SERIES (1 of 2 - PCV13) 1/29/2015 3/8/2009    COLONOSCOPY 10/23/2019 10/23/2014, 1/3/2014            Current Immunizations     Pneumococcal polysaccharide vaccine (PPSV-23) 3/8/2009      Below and/or attached are the medications your provider expects you to take. Review all of your home medications and newly ordered medications with your provider and/or pharmacist. Follow medication instructions as directed by your provider and/or pharmacist. Please keep your medication list with you and share with your provider. Update the information when medications are discontinued, doses are changed, or new medications (including over-the-counter products) are added; and carry medication information at all times in the event of emergency situations     Allergies:  FOOD - (reactions not documented)     CATAPRES - (reactions not documented)     DEMEROL - (reactions not documented)     MORPHINE - (reactions not documented)     SULFA DRUGS - (reactions not documented)               Medications  Valid as of: April 28, 2017 -  6:14 PM    Generic Name Brand Name Tablet Size Instructions for use    AmLODIPine Besylate (Tab) NORVASC 10 MG TAKE 1 TABLET BY MOUTH DAILY        Ascorbic Acid (Tab) ascorbic acid 500 MG Take 500 mg by mouth every day.        Aspirin (Chew Tab) ASA 81 MG Take 81 mg by mouth every day.        B Complex-C   Take  by mouth.        Cholecalciferol (Tab)  Vitamin D-3 5000 UNITS Take  by mouth every day.        Doxycycline Hyclate (Tab) VIBRAMYCIN 100 MG Take 1 Tab by mouth 2 times a day for 10 days.        Fenofibrate (Tab) TRICOR 145 MG TAKE 1 TABLET BY MOUTH EVERY DAY        LORazepam (Tab) ATIVAN 0.5 MG Take 1 Tab by mouth every four hours as needed for Anxiety.        Lovastatin (Tab) MEVACOR 40 MG TAKE 1 TABLET BY MOUTH EVERY DAY        Metoprolol Tartrate (Tab) LOPRESSOR 25 MG Take 1 Tab by mouth 2 times a day.        Multiple Vitamins-Minerals   Take  by mouth.        Omega-3 Fatty Acids (Cap) fish oil 1000 MG Take 1,000 mg by mouth 3 times a day, with meals.        OxyCODONE HCl (Tab) OXY-IR 15 MG Take 30 mg by mouth 2 Times a Day.        OxyCODONE HCl (Tablet Extended Release 12 hour Abuse-Deterrent) OXYCONTIN 60 MG Take 30 mg by mouth 4 times a day.        Pantoprazole Sodium   Take  by mouth.        Pregabalin   Take 150 mg by mouth 4 times a day.        Prochlorperazine Maleate (Tab) COMPAZINE 10 MG Take 10 mg by mouth 2 times a day as needed.        Psyllium   Take  by mouth.        ROPINIRole HCl (Tab) REQUIP 0.5 MG TAKE 1 TABLET BY MOUTH THREE TIMES DAILY        TiaGABine HCl (Tab) GABITRIL 4 MG Take 4 mg by mouth 3 times a day.        Topiramate (Tab) TOPAMAX 100 MG Take 1 Tab by mouth every day.        .                 Medicines prescribed today were sent to:     [facility] [address] [place] [address] - [address] [place] [address] AT [facility] OF [facility]. & [facility]    [address] [facility] [address]    Phone: [phone] Fax: [phone]    Open 24 Hours?: No    [facility] - [place], [place] - [address] [facility]    [address] [facility] [address]    Phone: [phone] Fax: [phone]    Open 24 Hours?: No      Medication refill instructions:       If your prescription bottle indicates you have medication refills left, it is not necessary to call your provider’s office. Please contact your pharmacy and they will refill  your medication.    If your prescription bottle indicates you do not have any refills left, you may request refills at any time through one of the following ways: The online [employer] system (except Urgent Care), by calling your provider’s office, or by asking your pharmacy to contact your provider’s office with a refill request. Medication refills are processed only during regular business hours and may not be available until the next business day. Your provider may request additional information or to have a follow-up visit with you prior to refilling your medication.   *Please Note: Medication refills are assigned a new Rx number when refilled electronically. Your pharmacy may indicate that no refills were authorized even though a new prescription for the same medication is available at the pharmacy. Please request the medicine by name with the pharmacy before contacting your provider for a refill.        Instructions    Cellulitis  Cellulitis is an infection of the skin and the tissue beneath it. The infected area is usually red and tender. Cellulitis occurs most often in the arms and lower legs.   CAUSES   Cellulitis is caused by bacteria that enter the skin through cracks or cuts in the skin. The most common types of bacteria that cause cellulitis are staphylococci and streptococci.  SIGNS AND SYMPTOMS   · Redness and warmth.  · Swelling.  · Tenderness or pain.  · Fever.  DIAGNOSIS   Your health care provider can usually determine what is wrong based on a physical exam. Blood tests may also be done.  TREATMENT   Treatment usually involves taking an antibiotic medicine.  HOME CARE INSTRUCTIONS   · Take your antibiotic medicine as directed by your health care provider. Finish the antibiotic even if you start to feel better.  · Keep the infected arm or leg elevated to reduce swelling.  · Apply a warm cloth to the affected area up to 4 times per day to relieve pain.  · Take medicines only as directed by your health  care provider.  · Keep all follow-up visits as directed by your health care provider.  SEEK MEDICAL CARE IF:   · You notice red streaks coming from the infected area.  · Your red area gets larger or turns dark in color.  · Your bone or joint underneath the infected area becomes painful after the skin has healed.  · Your infection returns in the same area or another area.  · You notice a swollen bump in the infected area.  · You develop new symptoms.  · You have a fever.  SEEK IMMEDIATE MEDICAL CARE IF:   · You feel very sleepy.  · You develop vomiting or diarrhea.  · You have a general ill feeling (malaise) with muscle aches and pains.  MAKE SURE YOU:   · Understand these instructions.  · Will watch your condition.  · Will get help right away if you are not doing well or get worse.     This information is not intended to replace advice given to you by your health care provider. Make sure you discuss any questions you have with your health care provider.     Document Released: 09/27/2006 Document Revised: 01/08/2016 Document Reviewed: 03/04/2013  [email or web address] Interactive Patient Education ©2016 [email or web address]         Other Notes About Your Plan     Dr Connell -- GI  Dr Steen -- Pain  Dr Varghese -- ENT  Dr Hanna -- Neurology  Dr Najjar -- nephrology           MyChart Status: Patient Declined

## 2017-05-03 ASSESSMENT — ENCOUNTER SYMPTOMS
NUMBNESS: 0
VOMITING: 0
SHORTNESS OF BREATH: 0
DIZZINESS: 0
SORE THROAT: 0
FEVER: 0
EYE PAIN: 0
NAUSEA: 0
WEAKNESS: 0
CHILLS: 0
MYALGIAS: 0

## 2017-05-03 NOTE — PROGRESS NOTES
Subjective:      Wilmer Flores is a 67 y.o. male who presents with Toe Pain            Nail Problem  This is a new problem. The current episode started in the past 7 days. The problem occurs constantly. The problem has been rapidly worsening. Pertinent negatives include no chest pain, chills, fever, myalgias, nausea, numbness, rash, sore throat, vomiting or weakness.       Review of Systems   Constitutional: Negative for fever and chills.   HENT: Negative for sore throat.    Eyes: Negative for pain.   Respiratory: Negative for shortness of breath.    Cardiovascular: Negative for chest pain.   Gastrointestinal: Negative for nausea and vomiting.   Genitourinary: Negative for hematuria.   Musculoskeletal: Negative for myalgias.   Skin: Negative for rash.   Neurological: Negative for dizziness, weakness and numbness.     Allergies   Allergen Reactions   • Food      Allergic to strawberries   • Catapres [Clonidine Hcl]    • Demerol    • Morphine    • Sulfa Drugs          Objective:     /64 mmHg  Pulse 84  Temp(Src) 37 °C (98.6 °F)  Resp 14  Wt 96.616 kg (213 lb)  SpO2 94%     Physical Exam   Constitutional: He is oriented to person, place, and time. He appears well-developed and well-nourished. No distress.   HENT:   Head: Normocephalic and atraumatic.   Eyes: Conjunctivae and EOM are normal. Pupils are equal, round, and reactive to light.   Cardiovascular: Normal rate and regular rhythm.    No murmur heard.  Pulmonary/Chest: Effort normal and breath sounds normal. No respiratory distress.   Abdominal: Soft. He exhibits no distension. There is no tenderness.   Musculoskeletal:        Feet:    Neurological: He is alert and oriented to person, place, and time. He has normal reflexes. No sensory deficit.   Skin: Skin is warm and dry.   Psychiatric: He has a normal mood and affect.               Assessment/Plan:     1. Cellulitis of toe of left foot  Differential diagnosis, natural history, supportive care, and  indications for immediate follow-up discussed.   - doxycycline (VIBRAMYCIN) 100 MG Tab; Take 1 Tab by mouth 2 times a day for 10 days.  Dispense: 20 Tab; Refill: 0

## 2017-06-16 RX ORDER — FENOFIBRATE 145 MG/1
TABLET, COATED ORAL
Qty: 90 TAB | Refills: 1 | Status: SHIPPED | OUTPATIENT
Start: 2017-06-16 | End: 2017-12-09 | Stop reason: SDUPTHER

## 2017-06-16 NOTE — TELEPHONE ENCOUNTER
Was the patient seen in the last year in this department? Yes     Does patient have an active prescription for medications requested? No     Received Request Via: Pharmacy      Pt met protocol?: Yes, OV 3/17   Lab Results   Component Value Date/Time    HDL 43 09/10/2016 12:07 PM

## 2017-06-17 NOTE — TELEPHONE ENCOUNTER
Pt has had OV within the 12 month protocol and lipid panel is current. 6 month supply sent to pharmacy.   Lab Results   Component Value Date/Time    CHOLESTEROL, 09/10/2016 12:07 PM    LDL 73 09/10/2016 12:07 PM    HDL 43 09/10/2016 12:07 PM    TRIGLYCERIDES 189* 09/10/2016 12:07 PM       Lab Results   Component Value Date/Time    SODIUM 139 03/27/2017 03:29 PM    POTASSIUM 4.2 03/27/2017 03:29 PM    CHLORIDE 106 03/27/2017 03:29 PM    CO2 25 03/27/2017 03:29 PM    GLUCOSE 132* 03/27/2017 03:29 PM    BUN 23* 03/27/2017 03:29 PM    CREATININE 1.68* 03/27/2017 03:29 PM     Lab Results   Component Value Date/Time    ALKALINE PHOSPHATASE 35 03/27/2017 03:29 PM    AST(SGOT) 16 03/27/2017 03:29 PM    ALT(SGPT) 15 03/27/2017 03:29 PM    TOTAL BILIRUBIN 0.6 03/27/2017 03:29 PM

## 2017-06-26 NOTE — TELEPHONE ENCOUNTER
Was the patient seen in the last year in this department? Yes     Does patient have an active prescription for medications requested? No     Received Request Via: Pharmacy      Pt met protocol?: Yes    LAST OV 03/30/2017    BP Readings from Last 1 Encounters:   04/28/17 108/64

## 2017-06-27 NOTE — TELEPHONE ENCOUNTER
Refill X 6 months, sent to pharmacy.Pt. Seen in the last 6 months per protocol.   Lab Results   Component Value Date/Time    SODIUM 139 03/27/2017 03:29 PM    POTASSIUM 4.2 03/27/2017 03:29 PM    CHLORIDE 106 03/27/2017 03:29 PM    CO2 25 03/27/2017 03:29 PM    GLUCOSE 132* 03/27/2017 03:29 PM    BUN 23* 03/27/2017 03:29 PM    CREATININE 1.68* 03/27/2017 03:29 PM

## 2017-07-10 ENCOUNTER — OFFICE VISIT (OUTPATIENT)
Dept: MEDICAL GROUP | Facility: PHYSICIAN GROUP | Age: 67
End: 2017-07-10
Payer: MEDICARE

## 2017-07-10 VITALS
BODY MASS INDEX: 27.9 KG/M2 | HEIGHT: 72 IN | DIASTOLIC BLOOD PRESSURE: 72 MMHG | TEMPERATURE: 97.3 F | SYSTOLIC BLOOD PRESSURE: 110 MMHG | HEART RATE: 62 BPM | OXYGEN SATURATION: 93 % | WEIGHT: 206 LBS

## 2017-07-10 DIAGNOSIS — G89.29 NECK PAIN, CHRONIC: Chronic | ICD-10-CM

## 2017-07-10 DIAGNOSIS — Z00.00 MEDICARE ANNUAL WELLNESS VISIT, INITIAL: Primary | ICD-10-CM

## 2017-07-10 DIAGNOSIS — M54.50 CHRONIC MIDLINE LOW BACK PAIN WITHOUT SCIATICA: Chronic | ICD-10-CM

## 2017-07-10 DIAGNOSIS — N18.3 CKD (CHRONIC KIDNEY DISEASE), STAGE 3 (MODERATE): Chronic | ICD-10-CM

## 2017-07-10 DIAGNOSIS — I10 ESSENTIAL HYPERTENSION: Chronic | ICD-10-CM

## 2017-07-10 DIAGNOSIS — G25.81 RLS (RESTLESS LEGS SYNDROME): Chronic | ICD-10-CM

## 2017-07-10 DIAGNOSIS — R56.9 CONVULSIONS, UNSPECIFIED CONVULSION TYPE (HCC): ICD-10-CM

## 2017-07-10 DIAGNOSIS — R73.01 IMPAIRED FASTING BLOOD SUGAR: ICD-10-CM

## 2017-07-10 DIAGNOSIS — E78.1 HYPERTRIGLYCERIDEMIA: Chronic | ICD-10-CM

## 2017-07-10 DIAGNOSIS — E55.9 VITAMIN D DEFICIENCY: ICD-10-CM

## 2017-07-10 DIAGNOSIS — Z79.891 CHRONIC USE OF OPIATE DRUGS THERAPEUTIC PURPOSES: ICD-10-CM

## 2017-07-10 DIAGNOSIS — M54.2 NECK PAIN, CHRONIC: Chronic | ICD-10-CM

## 2017-07-10 DIAGNOSIS — Z85.820 HISTORY OF MALIGNANT MELANOMA OF SKIN: ICD-10-CM

## 2017-07-10 DIAGNOSIS — G89.29 CHRONIC MIDLINE LOW BACK PAIN WITHOUT SCIATICA: Chronic | ICD-10-CM

## 2017-07-10 DIAGNOSIS — H54.7 BLINDNESS: ICD-10-CM

## 2017-07-10 PROCEDURE — G0438 PPPS, INITIAL VISIT: HCPCS | Performed by: INTERNAL MEDICINE

## 2017-07-10 ASSESSMENT — PATIENT HEALTH QUESTIONNAIRE - PHQ9: CLINICAL INTERPRETATION OF PHQ2 SCORE: 0

## 2017-07-10 NOTE — PROGRESS NOTES
Chief Complaint   Patient presents with   • Annual Exam     initial         HPI:  Wilmer Flores is a 67 y.o. here for Medicare Annual Wellness Visit     Patient Active Problem List    Diagnosis Date Noted   • History of malignant melanoma of skin 07/10/2017   • Chronic nausea 01/15/2014   • Impaired fasting blood sugar 12/03/2013   • Convulsion (CMS-HCC) 03/07/2013   • Vitamin D deficiency 11/09/2011   • Hypertension 10/05/2011   • Hypertriglyceridemia 10/05/2011   • Neck pain, chronic 10/05/2011   • Chronic lower back pain 10/05/2011   • RLS (restless legs syndrome) 10/05/2011   • CKD (chronic kidney disease) 10/05/2011   • Blindness 10/05/2011       Current Outpatient Prescriptions   Medication Sig Dispense Refill   • metoprolol (LOPRESSOR) 25 MG Tab TAKE 1 TABLET BY MOUTH TWICE DAILY 180 Tab 1   • fenofibrate (TRICOR) 145 MG Tab TAKE 1 TABLET BY MOUTH EVERY DAY 90 Tab 1   • PANTOPRAZOLE SODIUM PO Take  by mouth.     • topiramate (TOPAMAX) 100 MG Tab Take 1 Tab by mouth every day. 90 Tab 3   • amlodipine (NORVASC) 10 MG Tab TAKE 1 TABLET BY MOUTH DAILY 90 Tab 3   • lovastatin (MEVACOR) 40 MG tablet TAKE 1 TABLET BY MOUTH EVERY DAY 90 Tab 3   • ropinirole (REQUIP) 0.5 MG Tab TAKE 1 TABLET BY MOUTH THREE TIMES DAILY 270 Tab 3   • Omega-3 Fatty Acids (FISH OIL) 1000 MG Cap capsule Take 1,000 mg by mouth 3 times a day, with meals.     • B Complex-C (SUPER B COMPLEX PO) Take  by mouth.     • ascorbic acid (ASCORBIC ACID) 500 MG Tab Take 500 mg by mouth every day.     • aspirin (ASA) 81 MG Chew Tab chewable tablet Take 81 mg by mouth every day.     • Psyllium (METAMUCIL PO) Take  by mouth.     • tiagabine (GABITRIL) 4 MG TABS Take 4 mg by mouth 3 times a day.     • oxyCODONE HCl CR (OXYCONTIN) 60 MG T12A tablet Take 30 mg by mouth 4 times a day.     • Cholecalciferol (VITAMIN D-3) 5000 UNITS TABS Take  by mouth every day.     • prochlorperazine (COMPAZINE) 10 MG TABS Take 10 mg by mouth 2 times a day as needed.     •  Multiple Vitamins-Minerals (MULTIVITAMIN PO) Take  by mouth.     • Pregabalin (LYRICA PO) Take 150 mg by mouth 4 times a day.     • lorazepam (ATIVAN) 0.5 MG TABS Take 1 Tab by mouth every four hours as needed for Anxiety. (Patient taking differently: Take 0.5 mg by mouth 1 time daily as needed for Anxiety.) 20 Each 0   • oxycodone (OXY-IR) 15 MG immediate release tablet Take 30 mg by mouth 2 Times a Day.       No current facility-administered medications for this visit.            Current supplements as per medication list.       Allergies: Food; Catapres; Demerol; Morphine; and Sulfa drugs    Current social contact/activities:  Patient is , although he is blind he has been an active volunteer in the community. He is going to look into getting another seeing-eye dog as his previous dog of 12 years recently passed away.     He  reports that he quit smoking about 8 years ago. His smoking use included Cigarettes. He has a 10 pack-year smoking history. He has never used smokeless tobacco. He reports that he does not drink alcohol or use illicit drugs.  Counseling given: Not Answered        DPA/Advanced Directive:  Patient has Durable Power of  on file.       ROS:    Gait: Uses a cane   Ostomy: no   Other tubes: no   Amputations: no   Chronic oxygen use: no   Last eye exam: 5 months ago   : Denies incontinence.       Screening:    Depression Screening    Little interest or pleasure in doing things?  0 - not at all  Feeling down, depressed , or hopeless? 0 - not at all  Trouble falling or staying asleep, or sleeping too much?     Feeling tired or having little energy?     Poor appetite or overeating?     Feeling bad about yourself - or that you are a failure or have let yourself or your family down?    Trouble concentrating on things, such as reading the newspaper or watching television?    Moving or speaking so slowly that other people could have noticed.  Or the opposite - being so fidgety or restless  that you have been moving around a lot more than usual?     Thoughts that you would be better off dead, or of hurting yourself?     Patient Health Questionnaire Score:    If depressive symptoms identified deferred to follow up visit unless specifically addressed in assessment and plan.    Interpretation of PHQ-9 Total Score   Score Severity   1-4 Minimal Depression   5-9 Mild Depression   10-14 Moderate Depression   15-19 Moderately Severe Depression   20-27 Severe Depression    Screening for Cognitive Impairment    Three Minute Recall (banana, sunrise, fence)  3/3    Draw clock face with all 12 numbers set to the hand to show 10 minures past 11 o'clock  0 Patient unable to complete due to vision.   If cognitive concerns identified deferred to follow up visit unless specifically addressed in assessment and plan.    Fall Risk Assessment    Has the patient had two or more falls in the last year or any fall with injury in the last year?  No  If Fall Risk identified deferred to follow up visit unless specifically addressed in assessment and plan.    Safety Assessment    Throw rugs on floor.  No  Handrails on all stairs.  Yes  Good lighting in all hallways.  Yes  Difficulty hearing.  No  Patient counseled about all safety risks that were identified.    Functional Assessment ADLs    Are there any barriers preventing you from cooking for yourself or meeting nutritional needs?  No.    Are there any barriers preventing you from driving safely or obtaining transportation?  No.    Are there any barriers preventing you from using a telephone or calling for help?  No.    Are there any barriers preventing you from shopping?  No.    Are there any barriers preventing you from taking care of your own finances?  No.    Are there any barriers preventing you from managing your medications?  No.    Are currently engaing any exercise or physical activity?  Yes.       Health Maintenance Summary                Annual Wellness Visit Overdue  1950     IMM DTaP/Tdap/Td Vaccine Overdue 1/29/1969     IMM ZOSTER VACCINE Overdue 1/29/2010     IMM PNEUMOCOCCAL 65+ (ADULT) LOW/MEDIUM RISK SERIES Overdue 1/29/2015      Done 3/8/2009 Imm Admin: Pneumococcal polysaccharide vaccine (PPSV-23)    IMM INFLUENZA Next Due 9/1/2017     COLONOSCOPY Next Due 10/23/2019      Done 10/23/2014 AMB REFERRAL TO GI FOR COLONOSCOPY     Patient has more history with this topic...          Patient Care Team:  Kia Patel D.O. as PCP - General (Internal Medicine)  Orlin Steen M.D. as Consulting Physician (Anesthesia)  Maxwell Connell M.D. as Consulting Physician (Gastroenterology)  Fadi Najjar, M.D. as Consulting Physician (Nephrology)  Chetan Steen M.D. as Consulting Physician (Surgery)  Darren Molina M.D. as Consulting Physician (Dermatology)  Dony Isbell M.D. (Neurology)      Social History   Substance Use Topics   • Smoking status: Former Smoker -- 1.00 packs/day for 10 years     Types: Cigarettes     Quit date: 01/01/2009   • Smokeless tobacco: Never Used      Comment: quit long time ago /used to smoke pack a day for 10 years   • Alcohol Use: No     Family History   Problem Relation Age of Onset   • Stroke Father    • Cancer Brother      pancreatic cacner   • Cancer Mother      ovarian   • Diabetes Maternal Grandmother      He  has a past medical history of Hypertension (10/5/2011); Hyperlipidemia (10/5/2011); Hypertriglyceridemia (10/5/2011); RLS (restless legs syndrome) (10/5/2011); Blindness - both eyes (10/5/2011); Unspecified urinary incontinence; Arthritis; CATARACT; Glaucoma; Seizure (CMS-[facility]); Cancer (CMS-HCC); CKD (chronic kidney disease) (10/5/2011); Kidney disease; Other specified disorder of intestines; Pneumonia; Neck pain, chronic (10/5/2011); Chronic lower back pain (10/5/2011); Pain (1/10/14); and Melanoma in situ of neck (CMS-HCC).   Past Surgical History   Procedure Laterality Date   • Laminotomy       cervical and lumbar   •  "Hernia repair       left inguinal hernia   • Other       laser for kidney stones   • Eye surgery       multiple atleast 40 for gluacoma and retinal detachment   • Open reduction       right elbow   • Other orthopedic surgery       carpal tunnel right wrist   • Other orthopedic surgery       cervical and neck fusions   • Gastroscopy with biopsy  1/15/2014     Performed by Abhi Bowen M.D. at [facility]   • Egd w/endoscopic ultrasound  1/15/2014     Performed by Abhi Bowen M.D. at [facility]   • Ercp w/sphincterotomy/papill.  1/15/2014     Performed by Abhi Boewn M.D. at [facility]       Exam:     Blood pressure 110/72, pulse 62, temperature 36.3 °C (97.3 °F), height 1.829 m (6' 0.01\"), weight 93.441 kg (206 lb), SpO2 93 %. Body mass index is 27.93 kg/(m^2).    Hearing excellent.    Dentition fair  Alert, oriented in no acute distress.  Eye contact is good, speech goal directed, affect calm      Assessment and Plan. The following treatment and monitoring plan is recommended:    1. History of malignant melanoma of skin    Patient had a melanoma removed from his neck, no nodes were involved. Sees dermatologist regularly for skin checks.    - Initial Wellness Visit - Includes PPPS ()    2. CKD (chronic kidney disease), stage 3 (moderate)    Patient only has one functioning kidney. His GFR has been stable. He is followed annually by nephrology.  - Initial Wellness Visit - Includes PPPS ()    3. Impaired fasting blood sugar    Patient has average blood sugar of about 120. He is not currently on any medications.   - Initial Wellness Visit - Includes PPPS ()    4. Chronic use of opiate drugs therapeutic purposes    Ongoing problem, patient is followed by pain management. He is compliant with his medications.  - Initial Wellness Visit - Includes PPPS ()    5. Chronic midline low back pain without sciatica    Ongoing problem, on narcotic pain " medication see above.  - Initial Wellness Visit - Includes PPPS ()    6. Neck pain, chronic    See #5. Ongoing problem.  - Initial Wellness Visit - Includes PPPS ()    7. Blindness    Stable problem, patient has been blind since 1995. He currently uses a cane, and is looking at getting a seeing eye dog.  - Initial Wellness Visit - Includes PPPS ()    8. Convulsions, unspecified convulsion type (CMS-HCC)  Stable problem, on several medications for epilepsy.  - Initial Wellness Visit - Includes PPPS ()    9. Essential hypertension    Stable problem, continue medications.  - Initial Wellness Visit - Includes PPPS ()    10. Hypertriglyceridemia    Stable problem, continue medication.  - Initial Wellness Visit - Includes PPPS ()    11. RLS (restless legs syndrome)    Stable problem, continue medications.  - Initial Wellness Visit - Includes PPPS ()    12. Vitamin D deficiency    Stable problem, continue vitamin D supplementation.  - Initial Wellness Visit - Includes PPPS ()    13. Medicare annual wellness visit, initial    Patient is up-to-date with all of his vaccines and cancer screening. Problem and medication lists reviewed and updated.  - Initial Wellness Visit - Includes PPPS ()        Services suggested: No services needed at this time  Health Care Screening: Age-appropriate preventive services Medicare covers discussed today and ordered if indicated.  Referrals offered: Community-based lifestyle interventions to reduce health risks and promote self-management and wellness, fall prevention, nutrition, physical activity, tobacco-use cessation, weight loss, and mental health services as per orders if indicated.    Discussion today about general wellness and lifestyle habits:    · Prevent falls and reduce trip hazards; Cautioned about securing or removing rugs.  · Have a working fire alarm and carbon monoxide detector;   · Engage in regular physical activity and social  activities         Follow-up: Return in about 1 year (around 7/10/2018) for AWV.

## 2017-07-10 NOTE — MR AVS SNAPSHOT
"        Wilmer Flores   7/10/2017 2:40 PM   Office Visit   MRN: [id number]    Department:  [facility]   Dept Phone:  [phone]    Description:  Male : 1950   Provider:  Jolie Campa M.D.           Reason for Visit     Annual Exam initial      Allergies as of 7/10/2017     Allergen Noted Reactions    Food 2013       Allergic to strawberries    Catapres [Clonidine Hcl] 10/05/2011       Demerol 10/05/2011       Morphine 10/05/2011       Sulfa Drugs 10/05/2011         You were diagnosed with     History of malignant melanoma of skin   [687546]       CKD (chronic kidney disease), stage 3 (moderate)   [5526058]       Impaired fasting blood sugar   [543038]       Chronic use of opiate drugs therapeutic purposes   [5900032]       Chronic midline low back pain without sciatica   [0666019]       Neck pain, chronic   [953561]       Blindness   [442103]       Convulsions, unspecified convulsion type (CMS-HCC)   [3920105]       Essential hypertension   [8153307]       Hypertriglyceridemia   [937256]       RLS (restless legs syndrome)   [860449]       Vitamin D deficiency   [2653591]         Vital Signs     Blood Pressure Pulse Temperature Height Weight Body Mass Index    110/72 mmHg 62 36.3 °C (97.3 °F) 1.829 m (6' 0.01\") 93.441 kg (206 lb) 27.93 kg/m2    Oxygen Saturation Smoking Status                93% Former Smoker          Basic Information     Date Of Birth Sex Race Ethnicity Preferred Language    1950 Male White Non- English      Your appointments     Jul 10, 2017  2:40 PM   ANNUAL EXAM PREVENTATIVE with Jolie Campa M.D.   [facility])    [address] [place] [id number] [phone]            2018  3:00 PM   Follow Up Visit with Dony Isbell M.D.   [facility] (--)    [address] [facility] [address], [address]  [facility] [address] [phone]           You will be receiving a confirmation call a few days " before your appointment from our automated call confirmation system.              Problem List              ICD-10-CM Priority Class Noted - Resolved    Hypertension (Chronic) I10   10/5/2011 - Present    Hypertriglyceridemia (Chronic) E78.1   10/5/2011 - Present    Neck pain, chronic (Chronic) M54.2, G89.29   10/5/2011 - Present    Chronic lower back pain (Chronic) M54.5, G89.29   10/5/2011 - Present    RLS (restless legs syndrome) (Chronic) G25.81   10/5/2011 - Present    CKD (chronic kidney disease) (Chronic) N18.9   10/5/2011 - Present    Blindness H54.0   10/5/2011 - Present    Vitamin D deficiency E55.9   11/9/2011 - Present    Convulsion (CMS-HCC) R56.9   3/7/2013 - Present    Impaired fasting blood sugar R73.01   12/3/2013 - Present    Chronic nausea R11.0   1/15/2014 - Present    History of malignant melanoma of skin Z85.820   7/10/2017 - Present      Health Maintenance        Date Due Completion Dates    IMM DTaP/Tdap/Td Vaccine (1 - Tdap) 1/29/1969 ---    IMM ZOSTER VACCINE 1/29/2010 ---    IMM PNEUMOCOCCAL 65+ (ADULT) LOW/MEDIUM RISK SERIES (1 of 2 - PCV13) 1/29/2015 3/8/2009    IMM INFLUENZA (1) 9/1/2017 ---    COLONOSCOPY 10/23/2019 10/23/2014, 1/3/2014            Current Immunizations     Pneumococcal polysaccharide vaccine (PPSV-23) 3/8/2009      Below and/or attached are the medications your provider expects you to take. Review all of your home medications and newly ordered medications with your provider and/or pharmacist. Follow medication instructions as directed by your provider and/or pharmacist. Please keep your medication list with you and share with your provider. Update the information when medications are discontinued, doses are changed, or new medications (including over-the-counter products) are added; and carry medication information at all times in the event of emergency situations     Allergies:  FOOD - (reactions not documented)     CATAPRES - (reactions not documented)     DEMEROL -  (reactions not documented)     MORPHINE - (reactions not documented)     SULFA DRUGS - (reactions not documented)               Medications  Valid as of: July 10, 2017 -  2:35 PM    Generic Name Brand Name Tablet Size Instructions for use    AmLODIPine Besylate (Tab) NORVASC 10 MG TAKE 1 TABLET BY MOUTH DAILY        Ascorbic Acid (Tab) ascorbic acid 500 MG Take 500 mg by mouth every day.        Aspirin (Chew Tab) ASA 81 MG Take 81 mg by mouth every day.        B Complex-C   Take  by mouth.        Cholecalciferol (Tab) Vitamin D-3 5000 UNITS Take  by mouth every day.        Fenofibrate (Tab) TRICOR 145 MG TAKE 1 TABLET BY MOUTH EVERY DAY        LORazepam (Tab) ATIVAN 0.5 MG Take 1 Tab by mouth every four hours as needed for Anxiety.        Lovastatin (Tab) MEVACOR 40 MG TAKE 1 TABLET BY MOUTH EVERY DAY        Metoprolol Tartrate (Tab) LOPRESSOR 25 MG TAKE 1 TABLET BY MOUTH TWICE DAILY        Multiple Vitamins-Minerals   Take  by mouth.        Omega-3 Fatty Acids (Cap) fish oil 1000 MG Take 1,000 mg by mouth 3 times a day, with meals.        OxyCODONE HCl (Tab) OXY-IR 15 MG Take 30 mg by mouth 2 Times a Day.        OxyCODONE HCl (Tablet Extended Release 12 hour Abuse-Deterrent) OXYCONTIN 60 MG Take 30 mg by mouth 4 times a day.        Pantoprazole Sodium   Take  by mouth.        Pregabalin   Take 150 mg by mouth 4 times a day.        Prochlorperazine Maleate (Tab) COMPAZINE 10 MG Take 10 mg by mouth 2 times a day as needed.        Psyllium   Take  by mouth.        ROPINIRole HCl (Tab) REQUIP 0.5 MG TAKE 1 TABLET BY MOUTH THREE TIMES DAILY        TiaGABine HCl (Tab) GABITRIL 4 MG Take 4 mg by mouth 3 times a day.        Topiramate (Tab) TOPAMAX 100 MG Take 1 Tab by mouth every day.        .                 Medicines prescribed today were sent to:     [id number]  [place] - [address] AT [facility] [address] [facility]    [address] [place] [address]    Phone: [phone] Fax:  [phone]    Open 24 Hours?: No    [facility] - [place], [place] - [address]    [address] [facility] [address]    Phone: [phone] Fax: [phone]    Open 24 Hours?: No      Medication refill instructions:       If your prescription bottle indicates you have medication refills left, it is not necessary to call your provider’s office. Please contact your pharmacy and they will refill your medication.    If your prescription bottle indicates you do not have any refills left, you may request refills at any time through one of the following ways: The online Pockethernet system (except Urgent Care), by calling your provider’s office, or by asking your pharmacy to contact your provider’s office with a refill request. Medication refills are processed only during regular business hours and may not be available until the next business day. Your provider may request additional information or to have a follow-up visit with you prior to refilling your medication.   *Please Note: Medication refills are assigned a new Rx number when refilled electronically. Your pharmacy may indicate that no refills were authorized even though a new prescription for the same medication is available at the pharmacy. Please request the medicine by name with the pharmacy before contacting your provider for a refill.        Other Notes About Your Plan     Dr Connell -- GI  Dr Steen -- Pain  Dr Varghese -- ENT  Dr Hanna -- Neurology  Dr Najjar -- nephrology           Pockethernet Access Code: [email or web address]  Expires: 8/9/2017  2:35 PM    Pockethernet  A secure, online tool to manage your health information     [facility]’s Pockethernet® is a secure, online tool that connects you to your personalized health information from the privacy of your home -- day or night - making it very easy for you to manage your healthcare. Once the activation process is completed, you can even access your medical information using the Pockethernet  zuly, which is available for free in the Apple Zuly store or Google Play store.     Itaro provides the following levels of access (as shown below):   My Chart Features   [facility] Primary [facility] Doctor [facility]  Specialists [facility]  Urgent  Care Non-[facility]  Primary Care  Doctor   Email your healthcare team securely and privately 24/7 X X X    Manage appointments: schedule your next appointment; view details of past/upcoming appointments X      Request prescription refills. X      View recent personal medical records, including lab and immunizations X X X X   View health record, including health history, allergies, medications X X X X   Read reports about your outpatient visits, procedures, consult and ER notes X X X X   See your discharge summary, which is a recap of your hospital and/or ER visit that includes your diagnosis, lab results, and care plan. X X       How to register for Itaro:  1. Go to  [email or web address].  2. Click on the Sign Up Now box, which takes you to the New Member Sign Up page. You will need to provide the following information:  a. Enter your Itaro Access Code exactly as it appears at the top of this page. (You will not need to use this code after you’ve completed the sign-up process. If you do not sign up before the expiration date, you must request a new code.)   b. Enter your date of birth.   c. Enter your home email address.   d. Click Submit, and follow the next screen’s instructions.  3. Create a Itaro ID. This will be your Itaro login ID and cannot be changed, so think of one that is secure and easy to remember.  4. Create a Itaro password. You can change your password at any time.  5. Enter your Password Reset Question and Answer. This can be used at a later time if you forget your password.   6. Enter your e-mail address. This allows you to receive e-mail notifications when new information is available in Itaro.  7. Click Sign Up. You can now view your health  information.    For assistance activating your Impulcity account, call [phone]

## 2017-07-31 RX ORDER — ROPINIROLE 0.5 MG/1
TABLET, FILM COATED ORAL
Qty: 270 TAB | Refills: 0 | Status: SHIPPED | OUTPATIENT
Start: 2017-07-31 | End: 2017-09-24 | Stop reason: SDUPTHER

## 2017-07-31 NOTE — TELEPHONE ENCOUNTER
Was the patient seen in the last year in this department? Yes     Does patient have an active prescription for medications requested? No     Received Request Via: Pharmacy      Pt met protocol?: Yes pt last ov 3/2017

## 2017-09-05 ENCOUNTER — HOSPITAL ENCOUNTER (OUTPATIENT)
Dept: RADIOLOGY | Facility: MEDICAL CENTER | Age: 67
End: 2017-09-05
Attending: PHYSICIAN ASSISTANT
Payer: MEDICARE

## 2017-09-05 DIAGNOSIS — M54.12 CERVICAL RADICULITIS: ICD-10-CM

## 2017-09-05 PROCEDURE — 72141 MRI NECK SPINE W/O DYE: CPT

## 2017-09-26 NOTE — TELEPHONE ENCOUNTER
Pt will be establishing with new pcp tamela Gao from [facility] grp please forward

## 2017-09-27 RX ORDER — ROPINIROLE 0.5 MG/1
0.5 TABLET, FILM COATED ORAL 3 TIMES DAILY
Qty: 90 TAB | Refills: 0 | Status: SHIPPED | OUTPATIENT
Start: 2017-09-27 | End: 2017-10-12 | Stop reason: SDUPTHER

## 2017-10-05 ENCOUNTER — TELEPHONE (OUTPATIENT)
Dept: MEDICAL GROUP | Facility: MEDICAL CENTER | Age: 67
End: 2017-10-05

## 2017-10-05 NOTE — TELEPHONE ENCOUNTER
Future Appointments       Provider Department Center    10/11/2017 5:20 PM Giovanni Gao M.D. [facility] [address]    10/16/2017 4:00 PM [facility]    10/16/2017 4:30 PM [facility] CT 1 IM[facility]    1/15/2018 3:00 PM Dony Isbell M.D. [facility] Neurology     1/26/2018 3:00 PM Dony Isbell M.D. [facility] Neurology         ESTABLISHED PATIENT PRE-VISIT PLANNING     Note: Patient will not be contacted if there is no indication to call.     1.  Reviewed note from last office visit with PCP and/or other med group provider: Yes    2.  If any orders were placed at last visit, do we have Results/Consult Notes?        •  Labs - Labs were not ordered at last office visit.       •  Imaging - Imaging was not ordered at last office visit.       •  Referrals - No referrals were ordered at last office visit.    3.  Immunizations were updated in [facility] using WebIZ?: Yes       •  Web Iz Recommendations: FLU, PREVNAR (PCV13) , TDAP and ZOSTAVAX (Shingles)    4.  Patient is due for the following Health Maintenance Topics:   Health Maintenance Due   Topic Date Due   • IMM DTaP/Tdap/Td Vaccine (1 - Tdap) 01/29/1969   • IMM ZOSTER VACCINE  01/29/2010   • IMM PNEUMOCOCCAL 65+ (ADULT) LOW/MEDIUM RISK SERIES (1 of 2 - PCV13) 01/29/2015   • IMM INFLUENZA (1) 09/01/2017           5.  Patient was not informed to arrive 15 min prior to their scheduled appointment and bring in their medication bottles.

## 2017-10-12 ENCOUNTER — OFFICE VISIT (OUTPATIENT)
Dept: MEDICAL GROUP | Facility: MEDICAL CENTER | Age: 67
End: 2017-10-12
Payer: MEDICARE

## 2017-10-12 VITALS
OXYGEN SATURATION: 93 % | HEIGHT: 72 IN | HEART RATE: 57 BPM | BODY MASS INDEX: 29.39 KG/M2 | DIASTOLIC BLOOD PRESSURE: 66 MMHG | WEIGHT: 217 LBS | RESPIRATION RATE: 16 BRPM | TEMPERATURE: 97.6 F | SYSTOLIC BLOOD PRESSURE: 104 MMHG

## 2017-10-12 DIAGNOSIS — G25.81 RLS (RESTLESS LEGS SYNDROME): Chronic | ICD-10-CM

## 2017-10-12 DIAGNOSIS — I10 ESSENTIAL HYPERTENSION: ICD-10-CM

## 2017-10-12 DIAGNOSIS — R73.01 IMPAIRED FASTING BLOOD SUGAR: ICD-10-CM

## 2017-10-12 DIAGNOSIS — Z23 NEED FOR PNEUMOCOCCAL VACCINE: ICD-10-CM

## 2017-10-12 DIAGNOSIS — R07.9 CHEST PAIN, UNSPECIFIED TYPE: ICD-10-CM

## 2017-10-12 LAB
HBA1C MFR BLD: 6 % (ref ?–5.8)
INT CON NEG: NEGATIVE
INT CON POS: POSITIVE

## 2017-10-12 PROCEDURE — 99214 OFFICE O/P EST MOD 30 MIN: CPT | Mod: 25 | Performed by: INTERNAL MEDICINE

## 2017-10-12 PROCEDURE — 83036 HEMOGLOBIN GLYCOSYLATED A1C: CPT | Performed by: INTERNAL MEDICINE

## 2017-10-12 PROCEDURE — 93000 ELECTROCARDIOGRAM COMPLETE: CPT | Performed by: INTERNAL MEDICINE

## 2017-10-12 PROCEDURE — G0009 ADMIN PNEUMOCOCCAL VACCINE: HCPCS | Performed by: INTERNAL MEDICINE

## 2017-10-12 PROCEDURE — 90670 PCV13 VACCINE IM: CPT | Performed by: INTERNAL MEDICINE

## 2017-10-12 RX ORDER — ROPINIROLE 0.5 MG/1
0.5 TABLET, FILM COATED ORAL 3 TIMES DAILY
Qty: 90 TAB | Refills: 3 | Status: SHIPPED | OUTPATIENT
Start: 2017-10-12 | End: 2018-03-02 | Stop reason: SDUPTHER

## 2017-10-12 NOTE — PROGRESS NOTES
CC: Establishing care multiple issues    HPI:   Wilmer presents today with the following.    1. Impaired fasting blood sugar  History of elevated blood sugars and a family history of diabetes last A1c of 5.8. He is due for recheck and checked in office at 6.0. He reports his weight has gone up since his last visit.    2. Essential hypertension  Blood pressure well controlled denying any shortness of breath or lower extremity edema.    3. RLS (restless legs syndrome)  Does have restless leg syndrome requesting refills of medication.    4. Chest pain, unspecified type  He is complaining of one month of intermittent chest pain. Reports it happens multiple times during the day. It lasts for 30 seconds and is substernal and dull in nature. Fairly benign without radiation no nausea vomiting or shortness of breath associated. He denies any relationship to activity and not meals. He is possibly have a spinal surgery in the coming months.    5. Need for pneumococcal vaccine        Patient Active Problem List    Diagnosis Date Noted   • History of malignant melanoma of skin 07/10/2017   • Chronic nausea 01/15/2014   • Impaired fasting blood sugar 12/03/2013   • Convulsion (CMS-HCC) 03/07/2013   • Vitamin D deficiency 11/09/2011   • Essential hypertension 10/05/2011   • Hypertriglyceridemia 10/05/2011   • Neck pain, chronic 10/05/2011   • Chronic lower back pain 10/05/2011   • RLS (restless legs syndrome) 10/05/2011   • CKD (chronic kidney disease) 10/05/2011   • Blindness 10/05/2011       Current Outpatient Prescriptions   Medication Sig Dispense Refill   • ropinirole (REQUIP) 0.5 MG Tab Take 1 Tab by mouth 3 times a day. 90 Tab 3   • metoprolol (LOPRESSOR) 25 MG Tab TAKE 1 TABLET BY MOUTH TWICE DAILY 180 Tab 1   • fenofibrate (TRICOR) 145 MG Tab TAKE 1 TABLET BY MOUTH EVERY DAY 90 Tab 1   • PANTOPRAZOLE SODIUM PO Take  by mouth.     • topiramate (TOPAMAX) 100 MG Tab Take 1 Tab by mouth every day. 90 Tab 3   • amlodipine  (NORVASC) 10 MG Tab TAKE 1 TABLET BY MOUTH DAILY 90 Tab 3   • lovastatin (MEVACOR) 40 MG tablet TAKE 1 TABLET BY MOUTH EVERY DAY 90 Tab 3   • Omega-3 Fatty Acids (FISH OIL) 1000 MG Cap capsule Take 1,000 mg by mouth 3 times a day, with meals.     • aspirin (ASA) 81 MG Chew Tab chewable tablet Take 81 mg by mouth every day.     • Psyllium (METAMUCIL PO) Take  by mouth.     • tiagabine (GABITRIL) 4 MG TABS Take 4 mg by mouth 3 times a day.     • oxyCODONE HCl CR (OXYCONTIN) 60 MG T12A tablet Take 30 mg by mouth 4 times a day.     • Cholecalciferol (VITAMIN D-3) 5000 UNITS TABS Take  by mouth every day.     • prochlorperazine (COMPAZINE) 10 MG TABS Take 10 mg by mouth 2 times a day as needed.     • Multiple Vitamins-Minerals (MULTIVITAMIN PO) Take  by mouth.     • Pregabalin (LYRICA PO) Take 150 mg by mouth 4 times a day.     • lorazepam (ATIVAN) 0.5 MG TABS Take 1 Tab by mouth every four hours as needed for Anxiety. (Patient taking differently: Take 0.5 mg by mouth 1 time daily as needed for Anxiety.) 20 Each 0   • oxycodone (OXY-IR) 15 MG immediate release tablet Take 30 mg by mouth 2 Times a Day.       No current facility-administered medications for this visit.          Allergies as of 10/12/2017 - Reviewed 07/10/2017   Allergen Reaction Noted   • Food  03/07/2013   • Catapres [clonidine hcl]  10/05/2011   • Demerol  10/05/2011   • Morphine  10/05/2011   • Sulfa drugs  10/05/2011        ROS: As per HPI.    /66   Pulse (!) 57   Temp 36.4 °C (97.6 °F)   Resp 16   Ht 1.829 m (6')   Wt 98.4 kg (217 lb)   SpO2 93%   BMI 29.43 kg/m²     Physical Exam:  Gen:         Alert and oriented, No apparent distress.  Neck:        No Lymphadenopathy or Bruits.  Lungs:     Clear to auscultation bilaterally  CV:          Regular rate and rhythm. No murmurs, rubs or gallops.               Ext:          No clubbing, cyanosis, edema.    EKG:  Normal sinus rythm, no acute st-t wave changes. Early repolarization.  Assessment  and Plan.   67 y.o. male with the following issues.    1. Impaired fasting blood sugar  Discussed diet exercise weight loss recheck every 6 months.  - POCT  A1C    2. Essential hypertension  Currently well controlled, Discuss diet, exercise and salt restriction.    3. RLS (restless legs syndrome)  Refilled medication    4. Chest pain, unspecified type  Not overwhelming cardiac in nature however given his upcoming spine surgery and lack of functional capacity have written for stress test. Have given ER precautions for severe pain.  - EKG - Clinic performed    5. Need for pneumococcal vaccine    - PNEUMOCOCCAL CONJUGATE VACCINE 13-VALENT

## 2017-10-16 ENCOUNTER — HOSPITAL ENCOUNTER (OUTPATIENT)
Dept: RADIOLOGY | Facility: MEDICAL CENTER | Age: 67
End: 2017-10-16
Attending: PHYSICIAN ASSISTANT
Payer: MEDICARE

## 2017-10-16 DIAGNOSIS — M62.81 MUSCLE WEAKNESS (GENERALIZED): ICD-10-CM

## 2017-10-16 DIAGNOSIS — M54.2 CERVICALGIA: ICD-10-CM

## 2017-10-16 DIAGNOSIS — M54.5 LOW BACK PAIN, UNSPECIFIED BACK PAIN LATERALITY, UNSPECIFIED CHRONICITY, WITH SCIATICA PRESENCE UNSPECIFIED: ICD-10-CM

## 2017-10-16 PROCEDURE — 72125 CT NECK SPINE W/O DYE: CPT

## 2017-10-16 PROCEDURE — 72148 MRI LUMBAR SPINE W/O DYE: CPT

## 2017-10-28 DIAGNOSIS — I10 ESSENTIAL HYPERTENSION: ICD-10-CM

## 2017-10-31 RX ORDER — AMLODIPINE BESYLATE 10 MG/1
TABLET ORAL
Qty: 90 TAB | Refills: 3 | Status: SHIPPED | OUTPATIENT
Start: 2017-10-31 | End: 2018-12-07 | Stop reason: SDUPTHER

## 2017-10-31 NOTE — TELEPHONE ENCOUNTER
Refill request was sent to our pool, looks like a pt of iGovanni Gao. Please refill as you see fit.

## 2017-11-01 ENCOUNTER — HOSPITAL ENCOUNTER (OUTPATIENT)
Dept: LAB | Facility: MEDICAL CENTER | Age: 67
End: 2017-11-01
Attending: INTERNAL MEDICINE
Payer: MEDICARE

## 2017-11-01 DIAGNOSIS — R73.01 IMPAIRED FASTING BLOOD SUGAR: ICD-10-CM

## 2017-11-01 LAB
ALBUMIN SERPL BCP-MCNC: 4.6 G/DL (ref 3.2–4.9)
ALBUMIN/GLOB SERPL: 1.5 G/DL
ALP SERPL-CCNC: 30 U/L (ref 30–99)
ALT SERPL-CCNC: 14 U/L (ref 2–50)
ANION GAP SERPL CALC-SCNC: 10 MMOL/L (ref 0–11.9)
AST SERPL-CCNC: 22 U/L (ref 12–45)
BILIRUB SERPL-MCNC: 0.5 MG/DL (ref 0.1–1.5)
BUN SERPL-MCNC: 26 MG/DL (ref 8–22)
CALCIUM SERPL-MCNC: 9.6 MG/DL (ref 8.5–10.5)
CHLORIDE SERPL-SCNC: 106 MMOL/L (ref 96–112)
CHOLEST SERPL-MCNC: 154 MG/DL (ref 100–199)
CO2 SERPL-SCNC: 24 MMOL/L (ref 20–33)
CREAT SERPL-MCNC: 1.44 MG/DL (ref 0.5–1.4)
CREAT UR-MCNC: 150 MG/DL
EST. AVERAGE GLUCOSE BLD GHB EST-MCNC: 126 MG/DL
GFR SERPL CREATININE-BSD FRML MDRD: 49 ML/MIN/1.73 M 2
GLOBULIN SER CALC-MCNC: 3 G/DL (ref 1.9–3.5)
GLUCOSE SERPL-MCNC: 95 MG/DL (ref 65–99)
HBA1C MFR BLD: 6 % (ref 0–5.6)
HDLC SERPL-MCNC: 52 MG/DL
LDLC SERPL CALC-MCNC: 70 MG/DL
MICROALBUMIN UR-MCNC: <0.7 MG/DL
MICROALBUMIN/CREAT UR: NORMAL MG/G (ref 0–30)
POTASSIUM SERPL-SCNC: 4.3 MMOL/L (ref 3.6–5.5)
PROT SERPL-MCNC: 7.6 G/DL (ref 6–8.2)
SODIUM SERPL-SCNC: 140 MMOL/L (ref 135–145)
TRIGL SERPL-MCNC: 162 MG/DL (ref 0–149)

## 2017-11-01 PROCEDURE — 83036 HEMOGLOBIN GLYCOSYLATED A1C: CPT | Mod: GA

## 2017-11-01 PROCEDURE — 82043 UR ALBUMIN QUANTITATIVE: CPT

## 2017-11-01 PROCEDURE — 36415 COLL VENOUS BLD VENIPUNCTURE: CPT

## 2017-11-01 PROCEDURE — 82570 ASSAY OF URINE CREATININE: CPT

## 2017-11-01 PROCEDURE — 80053 COMPREHEN METABOLIC PANEL: CPT

## 2017-11-01 PROCEDURE — 80061 LIPID PANEL: CPT | Mod: GA

## 2017-11-03 ENCOUNTER — HOSPITAL ENCOUNTER (OUTPATIENT)
Dept: RADIOLOGY | Facility: MEDICAL CENTER | Age: 67
End: 2017-11-03
Attending: INTERNAL MEDICINE
Payer: MEDICARE

## 2017-11-03 DIAGNOSIS — R07.9 CHEST PAIN, UNSPECIFIED TYPE: ICD-10-CM

## 2017-11-03 PROCEDURE — 700111 HCHG RX REV CODE 636 W/ 250 OVERRIDE (IP)

## 2017-11-03 PROCEDURE — 78452 HT MUSCLE IMAGE SPECT MULT: CPT

## 2017-11-03 RX ORDER — REGADENOSON 0.08 MG/ML
INJECTION, SOLUTION INTRAVENOUS
Status: COMPLETED
Start: 2017-11-03 | End: 2017-11-03

## 2017-11-03 RX ADMIN — REGADENOSON 0.4 MG: 0.08 INJECTION, SOLUTION INTRAVENOUS at 15:59

## 2017-11-06 ENCOUNTER — TELEPHONE (OUTPATIENT)
Dept: MEDICAL GROUP | Facility: MEDICAL CENTER | Age: 67
End: 2017-11-06

## 2017-11-06 NOTE — TELEPHONE ENCOUNTER
----- Message from Giovanni Gao M.D. sent at 11/3/2017  5:53 PM PDT -----  Stress test of heart looks good.

## 2017-11-07 ENCOUNTER — OFFICE VISIT (OUTPATIENT)
Dept: MEDICAL GROUP | Facility: MEDICAL CENTER | Age: 67
End: 2017-11-07
Payer: MEDICARE

## 2017-11-07 VITALS
SYSTOLIC BLOOD PRESSURE: 122 MMHG | HEART RATE: 74 BPM | WEIGHT: 220 LBS | TEMPERATURE: 98.6 F | DIASTOLIC BLOOD PRESSURE: 78 MMHG | OXYGEN SATURATION: 95 % | BODY MASS INDEX: 29.8 KG/M2 | HEIGHT: 72 IN | RESPIRATION RATE: 16 BRPM

## 2017-11-07 DIAGNOSIS — R07.9 CHEST PAIN, UNSPECIFIED TYPE: ICD-10-CM

## 2017-11-07 PROCEDURE — 99213 OFFICE O/P EST LOW 20 MIN: CPT | Performed by: FAMILY MEDICINE

## 2017-11-08 NOTE — PROGRESS NOTES
cc: Chest pain    Subjective:     Wilmer Flores is a 67 y.o. male presentingWith his wife to discuss chest pain. Started about a month and a half ago. Describes a left-sided dull pain that radiates to the middle. It is intermittent, lasts for a second or so. It occurs about 2-3 times a day. Overall seems to be improving with time. Has tried no medications for this. Associated with nothing. Denies any nausea, vomiting, lightheadedness, dizziness, URI symptoms, shortness of breath, leg swelling, diarrhea, palpitations, heartburn, recent increase in activity, new medications, worsening heartburn. He can identify no particular triggers like exertion or certain positions or deep breaths. He had a cardiac nuclear stress test that was normal. He is wondering if he can get a chest x-ray as well.      Review of systems:  See above.        Current Outpatient Prescriptions:   •  amlodipine (NORVASC) 10 MG Tab, TAKE 1 TABLET BY MOUTH DAILY, Disp: 90 Tab, Rfl: 3  •  ropinirole (REQUIP) 0.5 MG Tab, Take 1 Tab by mouth 3 times a day., Disp: 90 Tab, Rfl: 3  •  metoprolol (LOPRESSOR) 25 MG Tab, TAKE 1 TABLET BY MOUTH TWICE DAILY, Disp: 180 Tab, Rfl: 1  •  fenofibrate (TRICOR) 145 MG Tab, TAKE 1 TABLET BY MOUTH EVERY DAY, Disp: 90 Tab, Rfl: 1  •  PANTOPRAZOLE SODIUM PO, Take  by mouth., Disp: , Rfl:   •  topiramate (TOPAMAX) 100 MG Tab, Take 1 Tab by mouth every day., Disp: 90 Tab, Rfl: 3  •  lovastatin (MEVACOR) 40 MG tablet, TAKE 1 TABLET BY MOUTH EVERY DAY, Disp: 90 Tab, Rfl: 3  •  Omega-3 Fatty Acids (FISH OIL) 1000 MG Cap capsule, Take 1,000 mg by mouth 3 times a day, with meals., Disp: , Rfl:   •  aspirin (ASA) 81 MG Chew Tab chewable tablet, Take 81 mg by mouth every day., Disp: , Rfl:   •  tiagabine (GABITRIL) 4 MG TABS, Take 4 mg by mouth 3 times a day., Disp: , Rfl:   •  oxyCODONE HCl CR (OXYCONTIN) 60 MG T12A tablet, Take 30 mg by mouth 4 times a day., Disp: , Rfl:   •  Cholecalciferol (VITAMIN D-3) 5000 UNITS TABS, Take   by mouth every day., Disp: , Rfl:   •  prochlorperazine (COMPAZINE) 10 MG TABS, Take 10 mg by mouth 2 times a day as needed., Disp: , Rfl:   •  Multiple Vitamins-Minerals (MULTIVITAMIN PO), Take  by mouth., Disp: , Rfl:   •  Pregabalin (LYRICA PO), Take 150 mg by mouth 4 times a day., Disp: , Rfl:   •  lorazepam (ATIVAN) 0.5 MG TABS, Take 1 Tab by mouth every four hours as needed for Anxiety. (Patient taking differently: Take 0.5 mg by mouth 1 time daily as needed for Anxiety.), Disp: 20 Each, Rfl: 0  •  oxycodone (OXY-IR) 15 MG immediate release tablet, Take 30 mg by mouth 2 Times a Day., Disp: , Rfl:   •  Psyllium (METAMUCIL PO), Take  by mouth., Disp: , Rfl:     Allergies   Allergen Reactions   • Food      Allergic to strawberries   • Catapres [Clonidine Hcl]    • Demerol    • Morphine    • Sulfa Drugs        Past Medical History:   Diagnosis Date   • Arthritis    • Blindness - both eyes 10/5/2011   • Cancer (CMS-HCC)     skin-melanoma   • CATARACT    • Chronic lower back pain 10/5/2011   • CKD (chronic kidney disease) 10/5/2011   • Glaucoma    • Hyperlipidemia 10/5/2011   • Hypertension 10/5/2011   • Hypertriglyceridemia 10/5/2011   • Kidney disease     has only left kidney   • Melanoma in situ of neck (CMS-HCC)    • Neck pain, chronic 10/5/2011   • Other specified disorder of intestines    • Pain 1/10/14    chronic pain in low back and neck; abdomen  8/10   • Pneumonia    • RLS (restless legs syndrome) 10/5/2011   • Seizure (CMS-HCC)    • Unspecified urinary incontinence      Past Surgical History:   Procedure Laterality Date   • GASTROSCOPY WITH BIOPSY  1/15/2014    Performed by Abhi Bowen M.D. at [facility]   • EGD W/ENDOSCOPIC ULTRASOUND  1/15/2014    Performed by Abhi Bowen M.D. at [facility]   • ERCP W/SPHINCTEROTOMY/PAPILL.  1/15/2014    Performed by Abhi Bowen M.D. at [facility]   • EYE SURGERY      multiple atleast 40 for gluacoma and retinal  detachment   • HERNIA REPAIR      left inguinal hernia   • LAMINOTOMY      cervical and lumbar   • OPEN REDUCTION      right elbow   • OTHER      laser for kidney stones   • OTHER ORTHOPEDIC SURGERY      carpal tunnel right wrist   • OTHER ORTHOPEDIC SURGERY      cervical and neck fusions     Family History   Problem Relation Age of Onset   • Stroke Father    • Cancer Brother      pancreatic cacner   • Cancer Mother      ovarian   • Diabetes Maternal Grandmother      Social History     Social History   • Marital status:      Spouse name: N/A   • Number of children: N/A   • Years of education: N/A     Occupational History   • Not on file.     Social History Main Topics   • Smoking status: Former Smoker     Packs/day: 1.00     Years: 10.00     Types: Cigarettes     Quit date: 1/1/2009   • Smokeless tobacco: Never Used      Comment: quit long time ago /used to smoke pack a day for 10 years   • Alcohol use No   • Drug use: No   • Sexual activity: No      Comment:  lives with wife/blind so wife helps with most stuff     Other Topics Concern   • Not on file     Social History Narrative   • No narrative on file       Objective:     Vitals: /78   Pulse 74   Temp 37 °C (98.6 °F)   Resp 16   Ht 1.829 m (6')   Wt 99.8 kg (220 lb)   SpO2 95%   BMI 29.84 kg/m²   General: Alert, pleasant, NAD  HEENT: Normocephalic.  Neck supple.  No thyromegaly or masses palpated. No cervical or supraclavicular lymphadenopathy.  Heart: Regular rate and rhythm.  S1 and S2 normal.  No murmurs appreciated.  Respiratory: Normal respiratory effort.  Clear to auscultation bilaterally.  Abdomen: Non-distended, soft  Skin: Warm, dry, no rashes.  Psych:  Affect/mood is normal, judgement is good, memory is intact, grooming is appropriate.    Assessment/Plan:     Diagnoses and all orders for this visit:    Chest pain, unspecified type  Gave reassurance that he likely does not have cardiac related chest pain given the normal  nuclear stress test and recent EKG done by his PCP when evaluated for this a couple weeks ago.. He gives no history of other concerning symptoms, is less likely PE, pericarditis, etc. Will order a chest x-ray to rule out any anatomical issues, although his exam was normal today. Recommended that he try some Tylenol a couple times a day to see if this helps.  -     DX-CHEST-2 VIEWS; Future        Return if symptoms worsen or fail to improve.

## 2017-11-09 ENCOUNTER — APPOINTMENT (OUTPATIENT)
Dept: RADIOLOGY | Facility: MEDICAL CENTER | Age: 67
End: 2017-11-09
Attending: PHYSICIAN ASSISTANT
Payer: MEDICARE

## 2017-11-20 ENCOUNTER — APPOINTMENT (OUTPATIENT)
Dept: RADIOLOGY | Facility: MEDICAL CENTER | Age: 67
End: 2017-11-20
Attending: PHYSICIAN ASSISTANT
Payer: MEDICARE

## 2017-11-22 DIAGNOSIS — E78.5 HYPERLIPIDEMIA, UNSPECIFIED HYPERLIPIDEMIA TYPE: ICD-10-CM

## 2017-11-23 NOTE — TELEPHONE ENCOUNTER
Was the patient seen in the last year in this department? Yes     Does patient have an active prescription for medications requested? No     Received Request Via: Patient      Pt met protocol?: Yes, last ov 11/7/17, last lipid profile 11/1/17  BP Readings from Last 1 Encounters:   11/07/17 122/78     PCP is Dr Gao

## 2017-11-26 DIAGNOSIS — E78.5 HYPERLIPIDEMIA, UNSPECIFIED HYPERLIPIDEMIA TYPE: ICD-10-CM

## 2017-11-26 RX ORDER — LOVASTATIN 40 MG/1
TABLET ORAL
Refills: 0 | Status: CANCELLED | OUTPATIENT
Start: 2017-11-26

## 2017-11-26 RX ORDER — LOVASTATIN 40 MG/1
40 TABLET ORAL
Qty: 90 TAB | Refills: 3 | Status: SHIPPED | OUTPATIENT
Start: 2017-11-26 | End: 2019-01-03 | Stop reason: SDUPTHER

## 2017-11-30 ENCOUNTER — HOSPITAL ENCOUNTER (OUTPATIENT)
Dept: RADIOLOGY | Facility: MEDICAL CENTER | Age: 67
End: 2017-11-30
Attending: FAMILY MEDICINE
Payer: MEDICARE

## 2017-11-30 ENCOUNTER — HOSPITAL ENCOUNTER (OUTPATIENT)
Dept: RADIOLOGY | Facility: MEDICAL CENTER | Age: 67
End: 2017-11-30
Attending: PHYSICIAN ASSISTANT
Payer: MEDICARE

## 2017-11-30 DIAGNOSIS — R07.9 CHEST PAIN, UNSPECIFIED TYPE: ICD-10-CM

## 2017-11-30 DIAGNOSIS — R51.9 INTRACTABLE HEADACHE, UNSPECIFIED CHRONICITY PATTERN, UNSPECIFIED HEADACHE TYPE: ICD-10-CM

## 2017-11-30 PROCEDURE — 700117 HCHG RX CONTRAST REV CODE 255: Performed by: PHYSICIAN ASSISTANT

## 2017-11-30 PROCEDURE — 71020 DX-CHEST-2 VIEWS: CPT

## 2017-11-30 PROCEDURE — A9577 INJ MULTIHANCE: HCPCS | Performed by: PHYSICIAN ASSISTANT

## 2017-11-30 PROCEDURE — 70553 MRI BRAIN STEM W/O & W/DYE: CPT

## 2017-11-30 RX ADMIN — GADOBENATE DIMEGLUMINE 10 ML: 529 INJECTION, SOLUTION INTRAVENOUS at 09:26

## 2017-12-07 ENCOUNTER — PATIENT OUTREACH (OUTPATIENT)
Dept: HEALTH INFORMATION MANAGEMENT | Facility: OTHER | Age: 67
End: 2017-12-07

## 2017-12-07 NOTE — PROGRESS NOTES
1. Attempt #: Final    2. [facility] Verified: no    3. Verify PCP: yes    4. Care Team Updated:       •   DME Company (gait device, O2, CPAP, etc.): NO       •   Other Specialists (eye doctor, derm, GYN, cardiology, endo, etc): NO/ not able to address (call took 30 minutes scheduling pt wife and pt)  5.  Reviewed/Updated the following with patient:       •   Communication Preference Obtained? YES       •   Preferred Pharmacy? YES       •   Preferred Lab? YES       •   Family History (document living status of immediate family members and if + hx of cancer, diabetes, hypertension, hyperlipidemia, heart attack, stroke) YES. Was Abstract Encounter opened and chart updated? YES    6. Jumbas Activation: declined    7. Jumbas Zuly: no    8. Annual Wellness Visit Scheduling  Scheduling Status:Scheduled      9. Care Gap Scheduling (Attempt to Schedule EACH Overdue Care Gap!)     Health Maintenance Due   Topic Date Due   • IMM INFLUENZA (1) 09/01/2017// Declined        Scheduled patient for Annual Wellness Visit      10. Patient was advised: “This is a free wellness visit. The provider will screen for medical conditions to help you stay healthy. If you have other concerns to address you may be asked to discuss these at a separate visit or there may be an additional fee.”     11. Patient was informed to arrive 15 min prior to their scheduled appointment and bring in their medication bottles.

## 2017-12-11 RX ORDER — FENOFIBRATE 145 MG/1
TABLET, COATED ORAL
Qty: 90 TAB | Refills: 3 | Status: SHIPPED | OUTPATIENT
Start: 2017-12-11 | End: 2019-03-08 | Stop reason: SDUPTHER

## 2017-12-14 ENCOUNTER — HOSPITAL ENCOUNTER (OUTPATIENT)
Dept: LAB | Facility: MEDICAL CENTER | Age: 67
End: 2017-12-14
Attending: INTERNAL MEDICINE
Payer: MEDICARE

## 2017-12-14 DIAGNOSIS — I10 ESSENTIAL HYPERTENSION: Chronic | ICD-10-CM

## 2017-12-14 DIAGNOSIS — N18.30 STAGE 3 CHRONIC KIDNEY DISEASE (HCC): ICD-10-CM

## 2017-12-14 LAB
ANION GAP SERPL CALC-SCNC: 10 MMOL/L (ref 0–11.9)
BUN SERPL-MCNC: 26 MG/DL (ref 8–22)
CALCIUM SERPL-MCNC: 9.6 MG/DL (ref 8.5–10.5)
CHLORIDE SERPL-SCNC: 110 MMOL/L (ref 96–112)
CO2 SERPL-SCNC: 23 MMOL/L (ref 20–33)
CREAT SERPL-MCNC: 1.49 MG/DL (ref 0.5–1.4)
CREAT UR-MCNC: 161 MG/DL
GFR SERPL CREATININE-BSD FRML MDRD: 47 ML/MIN/1.73 M 2
GLUCOSE SERPL-MCNC: 136 MG/DL (ref 65–99)
MICROALBUMIN UR-MCNC: <0.7 MG/DL
MICROALBUMIN/CREAT UR: NORMAL MG/G (ref 0–30)
POTASSIUM SERPL-SCNC: 4.4 MMOL/L (ref 3.6–5.5)
SODIUM SERPL-SCNC: 143 MMOL/L (ref 135–145)

## 2017-12-14 PROCEDURE — 80048 BASIC METABOLIC PNL TOTAL CA: CPT

## 2017-12-14 PROCEDURE — 36415 COLL VENOUS BLD VENIPUNCTURE: CPT

## 2017-12-14 PROCEDURE — 82043 UR ALBUMIN QUANTITATIVE: CPT

## 2017-12-14 PROCEDURE — 82570 ASSAY OF URINE CREATININE: CPT

## 2017-12-20 ENCOUNTER — OFFICE VISIT (OUTPATIENT)
Dept: NEPHROLOGY | Facility: MEDICAL CENTER | Age: 67
End: 2017-12-20
Payer: MEDICARE

## 2017-12-20 VITALS
DIASTOLIC BLOOD PRESSURE: 80 MMHG | SYSTOLIC BLOOD PRESSURE: 132 MMHG | HEIGHT: 72 IN | WEIGHT: 220 LBS | BODY MASS INDEX: 29.8 KG/M2 | TEMPERATURE: 97.9 F | RESPIRATION RATE: 16 BRPM | HEART RATE: 58 BPM | OXYGEN SATURATION: 96 %

## 2017-12-20 DIAGNOSIS — R56.9 SEIZURE (HCC): ICD-10-CM

## 2017-12-20 DIAGNOSIS — N18.30 STAGE 3 CHRONIC KIDNEY DISEASE (HCC): Chronic | ICD-10-CM

## 2017-12-20 DIAGNOSIS — I10 ESSENTIAL HYPERTENSION: ICD-10-CM

## 2017-12-20 PROCEDURE — 99214 OFFICE O/P EST MOD 30 MIN: CPT | Performed by: INTERNAL MEDICINE

## 2017-12-20 ASSESSMENT — ENCOUNTER SYMPTOMS
SHORTNESS OF BREATH: 0
HYPERTENSION: 1
SEIZURES: 1
VOMITING: 0
NAUSEA: 0

## 2017-12-21 NOTE — PROGRESS NOTES
Subjective:      Wilmer Flores is a 67 y.o. male who presents with Chronic Kidney Disease and Hypertension            Chronic Kidney Disease   This is a chronic problem. The current episode started more than 1 year ago. The problem occurs constantly. The problem has been unchanged. Pertinent negatives include no chest pain, nausea, urinary symptoms or vomiting.   Hypertension   This is a chronic problem. The current episode started more than 1 year ago. The problem is unchanged. The problem is controlled. Pertinent negatives include no chest pain, peripheral edema or shortness of breath. Risk factors for coronary artery disease include male gender. Past treatments include calcium channel blockers. The current treatment provides significant improvement. There are no compliance problems.  Hypertensive end-organ damage includes kidney disease. Identifiable causes of hypertension include chronic renal disease.       Review of Systems   Respiratory: Negative for shortness of breath.    Cardiovascular: Negative for chest pain and leg swelling.   Gastrointestinal: Negative for nausea and vomiting.   Genitourinary: Negative for dysuria and urgency.   Neurological: Positive for seizures.          Objective:     /80   Pulse (!) 58   Temp 36.6 °C (97.9 °F)   Resp 16   Ht 1.829 m (6')   Wt 99.8 kg (220 lb)   SpO2 96%   BMI 29.84 kg/m²      Physical Exam   Constitutional: He is oriented to person, place, and time. He appears well-developed and well-nourished. No distress.   HENT:   Right Ear: External ear normal.   Left Ear: External ear normal.   Nose: Nose normal.   Eyes:   Pt is blind   Cardiovascular: Regular rhythm.    Pulmonary/Chest: Effort normal and breath sounds normal. No respiratory distress. He has no wheezes.   Abdominal: Soft. Bowel sounds are normal.   Musculoskeletal: He exhibits no edema.   Neurological: He is oriented to person, place, and time.   Skin: Skin is warm. He is not diaphoretic.    Psychiatric: He has a normal mood and affect.   Nursing note and vitals reviewed.              Assessment/Plan:     1. Stage 3 chronic kidney disease  Creatinine stable  No uremic symptoms  Renal dose all medication  Avoid nephrotoxins    2. Essential hypertension  Blood pressure is controlled  Continue low-sodium diet    3. Seizure (CMS-HCC)  Patient to follow-up with neurologist

## 2017-12-29 ENCOUNTER — APPOINTMENT (OUTPATIENT)
Dept: ADMISSIONS | Facility: MEDICAL CENTER | Age: 67
End: 2017-12-29
Attending: NEUROLOGICAL SURGERY
Payer: MEDICARE

## 2017-12-29 DIAGNOSIS — Z01.812 PRE-PROCEDURAL LABORATORY EXAMINATION: ICD-10-CM

## 2017-12-29 LAB
INR PPP: 1.07 (ref 0.87–1.13)
PLATELET # BLD AUTO: 119 K/UL (ref 164–446)
PROTHROMBIN TIME: 13.6 SEC (ref 12–14.6)

## 2017-12-29 PROCEDURE — 36415 COLL VENOUS BLD VENIPUNCTURE: CPT

## 2017-12-29 PROCEDURE — 85610 PROTHROMBIN TIME: CPT

## 2017-12-29 PROCEDURE — 85049 AUTOMATED PLATELET COUNT: CPT

## 2018-01-03 ENCOUNTER — APPOINTMENT (OUTPATIENT)
Dept: RADIOLOGY | Facility: MEDICAL CENTER | Age: 68
End: 2018-01-03
Attending: PHYSICIAN ASSISTANT
Payer: MEDICARE

## 2018-01-03 ENCOUNTER — HOSPITAL ENCOUNTER (OUTPATIENT)
Facility: MEDICAL CENTER | Age: 68
End: 2018-01-03
Attending: NEUROLOGICAL SURGERY | Admitting: NEUROLOGICAL SURGERY
Payer: MEDICARE

## 2018-01-03 VITALS
HEART RATE: 56 BPM | WEIGHT: 221 LBS | DIASTOLIC BLOOD PRESSURE: 81 MMHG | TEMPERATURE: 97 F | RESPIRATION RATE: 16 BRPM | OXYGEN SATURATION: 93 % | BODY MASS INDEX: 28.36 KG/M2 | HEIGHT: 74 IN | SYSTOLIC BLOOD PRESSURE: 134 MMHG

## 2018-01-03 DIAGNOSIS — G91.2 IDIOPATHIC NORMAL PRESSURE HYDROCEPHALUS (INPH) (HCC): ICD-10-CM

## 2018-01-03 PROCEDURE — 62270 DX LMBR SPI PNXR: CPT

## 2018-01-03 PROCEDURE — 160002 HCHG RECOVERY MINUTES (STAT)

## 2018-01-03 PROCEDURE — 700111 HCHG RX REV CODE 636 W/ 250 OVERRIDE (IP)

## 2018-01-03 RX ORDER — LORAZEPAM 2 MG/ML
INJECTION INTRAMUSCULAR
Status: COMPLETED
Start: 2018-01-03 | End: 2018-01-03

## 2018-01-03 RX ORDER — LORAZEPAM 2 MG/ML
2 INJECTION INTRAMUSCULAR ONCE
Status: DISCONTINUED | OUTPATIENT
Start: 2018-01-03 | End: 2018-01-03 | Stop reason: HOSPADM

## 2018-01-03 ASSESSMENT — PAIN SCALES - GENERAL
PAINLEVEL_OUTOF10: 0

## 2018-01-03 NOTE — OR SURGEON
Immediate Post- Operative Note        PostOp Diagnosis: possible normal pressure hydrocephalus      Procedure(s): fluoro guided lumbar puncture      Estimated Blood Loss: Less than 5 ml        Complications: None            1/3/2018     2:49 PM     Reginald Posada

## 2018-01-04 NOTE — DISCHARGE INSTRUCTIONS
ACTIVITY: Rest and take it easy for the first 24 hours.  A responsible adult is recommended to remain with you during that time.  It is normal to feel sleepy.  We encourage you to not do anything that requires balance, judgment or coordination.    MILD FLU-LIKE SYMPTOMS ARE NORMAL. YOU MAY EXPERIENCE GENERALIZED MUSCLE ACHES, THROAT IRRITATION, HEADACHE AND/OR SOME NAUSEA.    FOR 24 HOURS DO NOT:  Drive, operate machinery or run household appliances.  Drink beer or alcoholic beverages.   Make important decisions or sign legal documents.    SPECIAL INSTRUCTIONS: keep incision dry for 24 hours    DIET: To avoid nausea, slowly advance diet as tolerated, avoiding spicy or greasy foods for the first day.  Add more substantial food to your diet according to your physician's instructions.  Babies can be fed formula or breast milk as soon as they are hungry.  INCREASE FLUIDS AND FIBER TO AVOID CONSTIPATION.    SURGICAL DRESSING/BATHING: do not shower for 24 hours, may shower tomorrow 1/4/2018    FOLLOW-UP APPOINTMENT:  A follow-up appointment should be arranged with your doctor Trinity at [phone]; call to schedule.    You should CALL YOUR PHYSICIAN if you develop:  Fever greater than 101 degrees F.  Pain not relieved by medication, or persistent nausea or vomiting.  Excessive bleeding (blood soaking through dressing) or unexpected drainage from the wound.  Extreme redness or swelling around the incision site, drainage of pus or foul smelling drainage.  Inability to urinate or empty your bladder within 8 hours.  Problems with breathing or chest pain.    You should call 911 if you develop problems with breathing or chest pain.  If you are unable to contact your doctor or surgical center, you should go to the nearest emergency room or urgent care center.  Physician's telephone #: [phone]    If any questions arise, call your doctor.  If your doctor is not available, please feel free to call the Surgical Center at  [phone].  The Center is open Monday through Friday from 7AM to 7PM.  You can also call the HEALTH HOTLINE open 24 hours/day, 7 days/week and speak to a nurse at [phone], or toll free at [phone].    A registered nurse may call you a few days after your surgery to see how you are doing after your procedure.    MEDICATIONS: Resume taking daily medication.  Take prescribed pain medication with food.  If no medication is prescribed, you may take non-aspirin pain medication if needed.  PAIN MEDICATION CAN BE VERY CONSTIPATING.  Take a stool softener or laxative such as senokot, pericolace, or milk of magnesia if needed.    If your physician has prescribed pain medication that includes Acetaminophen (Tylenol), do not take additional Acetaminophen (Tylenol) while taking the prescribed medication.    Depression / Suicide Risk    As you are discharged from this [facility] facility, it is important to learn how to keep safe from harming yourself.    Recognize the warning signs:  · Abrupt changes in personality, positive or negative- including increase in energy   · Giving away possessions  · Change in eating patterns- significant weight changes-  positive or negative  · Change in sleeping patterns- unable to sleep or sleeping all the time   · Unwillingness or inability to communicate  · Depression  · Unusual sadness, discouragement and loneliness  · Talk of wanting to die  · Neglect of personal appearance   · Rebelliousness- reckless behavior  · Withdrawal from people/activities they love  · Confusion- inability to concentrate     If you or a loved one observes any of these behaviors or has concerns about self-harm, here's what you can do:  · Talk about it- your feelings and reasons for harming yourself  · Remove any means that you might use to hurt yourself (examples: pills, rope, extension cords, firearm)  · Get professional help from the community (Mental Health, Substance Abuse, psychological  counseling)  · Do not be alone:Call your Safe Contact- someone whom you trust who will be there for you.  · Call your local CRISIS HOTLINE 835-9826 or 879-847-1070  · Call your local Children's Mobile Crisis Response Team [place] (335) 209-6989 or www.[employer]  · Call the toll free National Suicide Prevention Hotlines   · National Suicide Prevention Lifeline 060-036-XZWO (2146)  · National Hope Line Network 800-SUICIDE (235-1174)    Lumbar Puncture, Care After  Refer to this sheet in the next few weeks. These instructions provide you with information on caring for yourself after your procedure. Your health care provider may also give you more specific instructions. Your treatment has been planned according to current medical practices, but problems sometimes occur. Call your health care provider if you have any problems or questions after your procedure.  WHAT TO EXPECT AFTER THE PROCEDURE  After your procedure, it is typical to have the following sensations:  · Mild discomfort or pain at the insertion site.  · Mild headache that is relieved with pain medicines.  HOME CARE INSTRUCTIONS  · Avoid lifting anything heavier than 10 lb (4.5 kg) for at least 12 hours after the procedure.  · Drink enough fluids to keep your urine clear or pale yellow.  SEEK MEDICAL CARE IF:  · You have fever or chills.  · You have nausea or vomiting.  · You have a headache that lasts for more than 2 days.  SEEK IMMEDIATE MEDICAL CARE IF:  · You have any numbness or tingling in your legs.  · You are unable to control your bowel or bladder.  · You have bleeding or swelling in your back at the insertion site.  · You are dizzy or faint.     This information is not intended to replace advice given to you by your health care provider. Make sure you discuss any questions you have with your health care provider.     Document Released: 12/23/2014 Document Reviewed: 12/23/2014  Elsevier Interactive Patient Education ©2016 [employer]

## 2018-01-04 NOTE — OR NURSING
1451 Pt report received from IR nurse, pt brought over by RN on julián. Pt placed on monitor, VSS, family at bedside. Pts incision site is clean, dry and soft, no S/S of bleeding. Pt was given water and a snack. Pt has bedrest orders to lay flat x 3 hours     1530 pt is resting comfortably in bed no C/O pain, VSS, incision site is clean dry and soft.     1817 pt and family given D/C instructions, pt and family verbalized understanding. Pt was given information regarding lumbar puncture. Pt going home with family in a wheelchair.

## 2018-01-17 ENCOUNTER — OFFICE VISIT (OUTPATIENT)
Dept: MEDICAL GROUP | Facility: MEDICAL CENTER | Age: 68
End: 2018-01-17
Payer: MEDICARE

## 2018-01-17 VITALS
HEIGHT: 74 IN | SYSTOLIC BLOOD PRESSURE: 120 MMHG | OXYGEN SATURATION: 97 % | WEIGHT: 225 LBS | BODY MASS INDEX: 28.88 KG/M2 | TEMPERATURE: 97.1 F | HEART RATE: 58 BPM | RESPIRATION RATE: 16 BRPM | DIASTOLIC BLOOD PRESSURE: 66 MMHG

## 2018-01-17 DIAGNOSIS — R07.9 CHEST PAIN, UNSPECIFIED TYPE: ICD-10-CM

## 2018-01-17 PROCEDURE — 99214 OFFICE O/P EST MOD 30 MIN: CPT | Performed by: INTERNAL MEDICINE

## 2018-01-18 NOTE — PROGRESS NOTES
CC: Follow-up chest pain.    HPI:   Wilmer presents today with the following.    1. Chest pain, unspecified type  Presents after having stress tests and x-rays with no findings for his chest pain. Tests were normal. He reports getting events 2-3 times per day that affords 10 intensity. No nausea or vomiting and no other associated symptoms. He denies any pain while in office today.      Patient Active Problem List    Diagnosis Date Noted   • History of malignant melanoma of skin 07/10/2017   • Chronic nausea 01/15/2014   • Impaired fasting blood sugar 12/03/2013   • Convulsion (CMS-HCC) 03/07/2013   • Vitamin D deficiency 11/09/2011   • Essential hypertension 10/05/2011   • Hypertriglyceridemia 10/05/2011   • Neck pain, chronic 10/05/2011   • Chronic lower back pain 10/05/2011   • RLS (restless legs syndrome) 10/05/2011   • CKD (chronic kidney disease) 10/05/2011   • Blindness 10/05/2011       Current Outpatient Prescriptions   Medication Sig Dispense Refill   • fenofibrate (TRICOR) 145 MG Tab TAKE 1 TABLET BY MOUTH EVERY DAY 90 Tab 3   • lovastatin (MEVACOR) 40 MG tablet Take 1 Tab by mouth every day. 90 Tab 3   • amlodipine (NORVASC) 10 MG Tab TAKE 1 TABLET BY MOUTH DAILY 90 Tab 3   • ropinirole (REQUIP) 0.5 MG Tab Take 1 Tab by mouth 3 times a day. 90 Tab 3   • metoprolol (LOPRESSOR) 25 MG Tab TAKE 1 TABLET BY MOUTH TWICE DAILY 180 Tab 1   • PANTOPRAZOLE SODIUM PO Take  by mouth.     • topiramate (TOPAMAX) 100 MG Tab Take 1 Tab by mouth every day. 90 Tab 3   • tiagabine (GABITRIL) 4 MG TABS Take 4 mg by mouth 3 times a day.     • oxyCODONE HCl CR (OXYCONTIN) 60 MG T12A tablet Take 30 mg by mouth 4 times a day.     • Cholecalciferol (VITAMIN D-3) 5000 UNITS TABS Take  by mouth every day.     • Pregabalin (LYRICA PO) Take 150 mg by mouth 4 times a day.     • lorazepam (ATIVAN) 0.5 MG TABS Take 1 Tab by mouth every four hours as needed for Anxiety. (Patient taking differently: Take 0.5 mg by mouth 1 time daily as  "needed for Anxiety.) 20 Each 0   • oxycodone (OXY-IR) 15 MG immediate release tablet Take 30 mg by mouth 2 Times a Day.       No current facility-administered medications for this visit.          Allergies as of 01/17/2018 - Reviewed 01/17/2018   Allergen Reaction Noted   • Demerol Anaphylaxis 10/05/2011   • Food Vomiting 03/07/2013   • Morphine Anaphylaxis 10/05/2011   • Catapres [clonidine hcl] Hives 10/05/2011   • Sulfa drugs Hives and Vomiting 10/05/2011        ROS: As per HPI.    /66   Pulse (!) 58   Temp 36.2 °C (97.1 °F)   Resp 16   Ht 1.88 m (6' 2\")   Wt 102.1 kg (225 lb)   SpO2 97%   BMI 28.89 kg/m²     Physical Exam:  Gen:         Alert and oriented, No apparent distress.  Neck:        No Lymphadenopathy or Bruits.  Lungs:     Clear to auscultation bilaterally  CV:          Regular rate and rhythm. No murmurs, rubs or gallops.               Ext:          No clubbing, cyanosis, edema.      Assessment and Plan.   67 y.o. male with the following issues.    1. Chest pain, unspecified type  Suspect possible splenic flexure syndrome as it is somewhat positional on occasion have recommended some been no change of diet follow-up if not improving.      "

## 2018-01-26 ENCOUNTER — OFFICE VISIT (OUTPATIENT)
Dept: NEUROLOGY | Facility: MEDICAL CENTER | Age: 68
End: 2018-01-26
Payer: MEDICARE

## 2018-01-26 VITALS
TEMPERATURE: 97.9 F | SYSTOLIC BLOOD PRESSURE: 122 MMHG | DIASTOLIC BLOOD PRESSURE: 72 MMHG | HEIGHT: 74 IN | OXYGEN SATURATION: 97 % | WEIGHT: 218 LBS | HEART RATE: 57 BPM | BODY MASS INDEX: 27.98 KG/M2

## 2018-01-26 DIAGNOSIS — R56.9 CONVULSIONS, UNSPECIFIED CONVULSION TYPE (HCC): Primary | ICD-10-CM

## 2018-01-26 PROCEDURE — 99213 OFFICE O/P EST LOW 20 MIN: CPT | Performed by: PSYCHIATRY & NEUROLOGY

## 2018-01-26 ASSESSMENT — ENCOUNTER SYMPTOMS
MEMORY LOSS: 1
TINGLING: 1
HEADACHES: 1

## 2018-01-26 NOTE — PROGRESS NOTES
"Subjective:      Wilmer Flores is a 67 y.o. male who presents with [facility], for one year follow-up, with a history of nonepileptic convulsions and RLS.     HPI    Convulsions: He still has the occasional events once or twice in a month, still typically triggered when he is too much pain. He always recovers completely. He has been on Topamax 25 mg, 4 times a day, with good tolerability and compliance. He has been putting on weight for some time, was told that Topamax was the reason. He has also noted some cognitive symptoms since being on the drug, he then complains of balance difficulties and long-standing incontinence. Convinced that he may have NPH, MRI scan of the brain was done last year, revealing ventriculomegaly ex vacuo, the possibility of NPH entertained and mentioned in the report, he states he underwent lumbar puncture, and with CSF volume removal, his balance actually worsened.    RLS: Stable on Requip 0.5 mg, 3 times a day.    Medical, surgical and family history is reviewed, there are no new drug allergies. His migraines have been stable. He is on a long list of medications for his chronic pain including Gabitril, Lyrica, oxycodone, Topamax 25 mg, 4 times a day, the rest is per the electronic health record, noncontributory from my standpoint.    Review of Systems   Genitourinary: Negative for urgency.   Neurological: Positive for tingling and headaches.   Psychiatric/Behavioral: Positive for memory loss.        Objective:     /72   Pulse (!) 57   Temp 36.6 °C (97.9 °F)   Ht 1.88 m (6' 2\")   Wt 98.9 kg (218 lb)   SpO2 97%   BMI 27.99 kg/m²      Physical Exam    He appears in no acute distress. Vital signs are stable. There is no malar rash. The neck is supple. He is fully oriented, there is no aphasia. 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 There are no facial asymmetries, sensory exam is intact across the " midline, there is no bulbar dysfunction. Tone is normal throughout, there is no obvious hemiparesis. He stands easily, there is no bradykinesia. There is no tremor. Fine motor control with the hands and feet is intact.     Assessment/Plan:     1. Convulsions, unspecified convulsion type (CMS-HCC)  He would like to get off Topamax, the drug may in fact be part of the problem when it comes to his memory loss and cognitive symptoms, and though I doubt Topamax is really providing any benefit when it comes to these non-epileptic convulsions, I am still hopeful we can get him off the drug without worsening them. We will reduce by 25 mg every week until he is off by week #4. They will keep track of the events. I doubt that Topamax was the reason for his weight gain, more likely the Lyrica, but I'm also hopeful that the cognitive symptoms he has will be improved upon, his weight remaining stable. I will follow-up with him in about 6 months.    2. RLS  Stable on Requip 0.5 mg, 3 times a day. There are no issues with augmentation or rebound.    Time: Evaluation of 20 minutes for exam come review, discussion, education  Discussion: As mentioned in the assessment, over 50% of the time spent in face-to-face counseling and coordination care

## 2018-02-21 ENCOUNTER — TELEPHONE (OUTPATIENT)
Dept: MEDICAL GROUP | Facility: MEDICAL CENTER | Age: 68
End: 2018-02-21

## 2018-02-21 NOTE — TELEPHONE ENCOUNTER
1. Caller Name: dylon                                          Call Back Number:      Patient approves a detailed voicemail message: N\A    pharmacy called wantinf to know wich Metoprolo you want for patient if Metoprolol tartrate or metoprolo doc. please resend new rx

## 2018-04-12 ENCOUNTER — OFFICE VISIT (OUTPATIENT)
Dept: MEDICAL GROUP | Facility: MEDICAL CENTER | Age: 68
End: 2018-04-12
Payer: MEDICARE

## 2018-04-12 VITALS
HEART RATE: 61 BPM | OXYGEN SATURATION: 95 % | HEIGHT: 74 IN | DIASTOLIC BLOOD PRESSURE: 72 MMHG | BODY MASS INDEX: 27.72 KG/M2 | RESPIRATION RATE: 16 BRPM | SYSTOLIC BLOOD PRESSURE: 136 MMHG | TEMPERATURE: 97.6 F | WEIGHT: 216 LBS

## 2018-04-12 DIAGNOSIS — R73.01 IMPAIRED FASTING BLOOD SUGAR: ICD-10-CM

## 2018-04-12 DIAGNOSIS — R07.9 CHEST PAIN, UNSPECIFIED TYPE: ICD-10-CM

## 2018-04-12 DIAGNOSIS — I10 ESSENTIAL HYPERTENSION: ICD-10-CM

## 2018-04-12 LAB
HBA1C MFR BLD: 5.8 % (ref ?–5.8)
INT CON NEG: NEGATIVE
INT CON POS: POSITIVE

## 2018-04-12 PROCEDURE — 99214 OFFICE O/P EST MOD 30 MIN: CPT | Performed by: INTERNAL MEDICINE

## 2018-04-12 PROCEDURE — 83036 HEMOGLOBIN GLYCOSYLATED A1C: CPT | Performed by: INTERNAL MEDICINE

## 2018-04-12 NOTE — PROGRESS NOTES
CC: Follow-up blood sugars, chest pain, hypertension.    HPI:   Wilmer presents today with the following.    1. Impaired fasting blood sugar  Presents due for recheck of A1c fingerstick in the office today under 6. No true history of diabetes he is trying to lose weight.    2. Chest pain, unspecified type  Was seen for chest pain which is still present intermittently. He reports pain is brief in nature lasting 5 seconds not associated with activity. Recent stress test was negative. No breathing issues.    3. Essential hypertension  Does not check home reports compliance to medication.      Patient Active Problem List    Diagnosis Date Noted   • History of malignant melanoma of skin 07/10/2017   • Chronic nausea 01/15/2014   • Impaired fasting blood sugar 12/03/2013   • Convulsion (CMS-HCC) 03/07/2013   • Vitamin D deficiency 11/09/2011   • Essential hypertension 10/05/2011   • Hypertriglyceridemia 10/05/2011   • Neck pain, chronic 10/05/2011   • Chronic lower back pain 10/05/2011   • RLS (restless legs syndrome) 10/05/2011   • CKD (chronic kidney disease) 10/05/2011   • Blindness 10/05/2011       Current Outpatient Prescriptions   Medication Sig Dispense Refill   • ROPINIRole (REQUIP) 0.5 MG Tab TAKE 1 TABLET BY MOUTH THREE TIMES DAILY 90 Tab 3   • metoprolol (LOPRESSOR) 25 MG Tab Take 1 Tab by mouth 2 times a day. 180 Tab 3   • fenofibrate (TRICOR) 145 MG Tab TAKE 1 TABLET BY MOUTH EVERY DAY 90 Tab 3   • lovastatin (MEVACOR) 40 MG tablet Take 1 Tab by mouth every day. 90 Tab 3   • amlodipine (NORVASC) 10 MG Tab TAKE 1 TABLET BY MOUTH DAILY 90 Tab 3   • PANTOPRAZOLE SODIUM PO Take  by mouth.     • topiramate (TOPAMAX) 100 MG Tab Take 1 Tab by mouth every day. 90 Tab 3   • tiagabine (GABITRIL) 4 MG TABS Take 4 mg by mouth 3 times a day.     • oxyCODONE HCl CR (OXYCONTIN) 60 MG T12A tablet Take 30 mg by mouth 4 times a day.     • Cholecalciferol (VITAMIN D-3) 5000 UNITS TABS Take  by mouth every day.     • Pregabalin  "(LYRICA PO) Take 150 mg by mouth 4 times a day.     • lorazepam (ATIVAN) 0.5 MG TABS Take 1 Tab by mouth every four hours as needed for Anxiety. (Patient taking differently: Take 0.5 mg by mouth 1 time daily as needed for Anxiety.) 20 Each 0   • oxycodone (OXY-IR) 15 MG immediate release tablet Take 30 mg by mouth 2 Times a Day.       No current facility-administered medications for this visit.          Allergies as of 04/12/2018 - Reviewed 04/12/2018   Allergen Reaction Noted   • Demerol Anaphylaxis 10/05/2011   • Food Vomiting 03/07/2013   • Morphine Anaphylaxis 10/05/2011   • Catapres [clonidine hcl] Hives 10/05/2011   • Sulfa drugs Hives and Vomiting 10/05/2011        ROS: Denies Chest pain from activity, SOB, LE edema.    /72   Pulse 61   Temp 36.4 °C (97.6 °F)   Resp 16   Ht 1.88 m (6' 2\")   Wt 98 kg (216 lb)   SpO2 95%   BMI 27.73 kg/m²      Physical Exam:  Gen:         Alert and oriented, No apparent distress.  Neck:        No Lymphadenopathy or Bruits.  Lungs:     Clear to auscultation bilaterally  CV:          Regular rate and rhythm. No murmurs, rubs or gallops.               Ext:          No clubbing, cyanosis, edema.      Assessment and Plan.   68 y.o. male with the following issues.    1. Impaired fasting blood sugar  Discussed diet exercise weight loss. Recheck 6 months  - POCT  A1C    2. Chest pain, unspecified type  Negative stress test and x-ray sounds to be musculoskeletal. Patient he seems relieved that this is likely musculoskeletal he reports it is not severe enough to do anything about it.    3. Essential hypertension  Currently well controlled, Discuss diet, exercise and salt restriction.  No change to medication therapy.       "

## 2018-06-26 ENCOUNTER — APPOINTMENT (OUTPATIENT)
Dept: NEUROLOGY | Facility: MEDICAL CENTER | Age: 68
End: 2018-06-26
Payer: MEDICARE

## 2018-07-22 RX ORDER — ROPINIROLE 0.5 MG/1
TABLET, FILM COATED ORAL
Qty: 270 TAB | Refills: 3 | Status: SHIPPED | OUTPATIENT
Start: 2018-07-22 | End: 2019-07-22 | Stop reason: SDUPTHER

## 2018-10-12 ENCOUNTER — OFFICE VISIT (OUTPATIENT)
Dept: MEDICAL GROUP | Facility: MEDICAL CENTER | Age: 68
End: 2018-10-12
Payer: MEDICARE

## 2018-10-12 VITALS
OXYGEN SATURATION: 94 % | DIASTOLIC BLOOD PRESSURE: 64 MMHG | HEART RATE: 77 BPM | HEIGHT: 74 IN | RESPIRATION RATE: 16 BRPM | SYSTOLIC BLOOD PRESSURE: 120 MMHG

## 2018-10-12 DIAGNOSIS — R55 SYNCOPE, UNSPECIFIED SYNCOPE TYPE: ICD-10-CM

## 2018-10-12 DIAGNOSIS — I10 ESSENTIAL HYPERTENSION: ICD-10-CM

## 2018-10-12 DIAGNOSIS — R56.9 CONVULSIONS, UNSPECIFIED CONVULSION TYPE (HCC): ICD-10-CM

## 2018-10-12 DIAGNOSIS — R26.81 GAIT INSTABILITY: ICD-10-CM

## 2018-10-12 PROCEDURE — 99214 OFFICE O/P EST MOD 30 MIN: CPT | Performed by: INTERNAL MEDICINE

## 2018-10-12 NOTE — PROGRESS NOTES
CC: Convulsions, complaining of syncope.    HPI:   Wilmer presents today with the following.    1. Convulsions, unspecified convulsion type (HCC)  Presents with a diagnosis of convulsions not seizure.  As he was being roomed he was having what appeared to be spasms of pain.  Was called to the room by the medical assistant.  Witnessed patient jerking slightly.  Wife reported this is his typical seizure.  She proceeded to put an Ativan in his mouth and given a swig of orange juice despite advisements not to.  Discussion that would need to call EMS for transport to the emergency room he immediately awakened.  He was not postictal in nature.  He is fully aware and managed to take his Xanax and swallowing without aspirating.    That being said he does want to see a new neuro allergist.  He does have enlarged ventricles on MRI apparently previous LPs showing normal pressure.    2. Gait instability  Does report gait instability it is improved by his wife telling him he needs to think how to walk and he quickly is able to.    3. Syncope, unspecified syncope type  There are separate events where he passes out.  Wife reports he was standing up and was nonresponsive is one episode.  He reports that last anywhere 1-3 hours denies any chest pain or palpitation.  Recent stress test did not show any signs of myocardial infarction or jeopardized myocardium.  He cannot describe the symptoms any better other than loss of consciousness and awareness.        Patient Active Problem List    Diagnosis Date Noted   • History of malignant melanoma of skin 07/10/2017   • Chronic nausea 01/15/2014   • Impaired fasting blood sugar 12/03/2013   • Convulsion (HCC) 03/07/2013   • Vitamin D deficiency 11/09/2011   • Essential hypertension 10/05/2011   • Hypertriglyceridemia 10/05/2011   • Neck pain, chronic 10/05/2011   • Chronic lower back pain 10/05/2011   • RLS (restless legs syndrome) 10/05/2011   • CKD (chronic kidney disease) 10/05/2011   •  "Blindness 10/05/2011       Current Outpatient Prescriptions   Medication Sig Dispense Refill   • ROPINIRole (REQUIP) 0.5 MG Tab TAKE 1 TABLET BY MOUTH THREE TIMES DAILY 270 Tab 3   • ROPINIRole (REQUIP) 0.5 MG Tab TAKE 1 TABLET BY MOUTH THREE TIMES DAILY 90 Tab 3   • metoprolol (LOPRESSOR) 25 MG Tab Take 1 Tab by mouth 2 times a day. 180 Tab 3   • fenofibrate (TRICOR) 145 MG Tab TAKE 1 TABLET BY MOUTH EVERY DAY 90 Tab 3   • lovastatin (MEVACOR) 40 MG tablet Take 1 Tab by mouth every day. 90 Tab 3   • amlodipine (NORVASC) 10 MG Tab TAKE 1 TABLET BY MOUTH DAILY 90 Tab 3   • PANTOPRAZOLE SODIUM PO Take  by mouth.     • topiramate (TOPAMAX) 100 MG Tab Take 1 Tab by mouth every day. 90 Tab 3   • tiagabine (GABITRIL) 4 MG TABS Take 4 mg by mouth 3 times a day.     • oxyCODONE HCl CR (OXYCONTIN) 60 MG T12A tablet Take 30 mg by mouth 4 times a day.     • Cholecalciferol (VITAMIN D-3) 5000 UNITS TABS Take  by mouth every day.     • Pregabalin (LYRICA PO) Take 150 mg by mouth 4 times a day.     • lorazepam (ATIVAN) 0.5 MG TABS Take 1 Tab by mouth every four hours as needed for Anxiety. (Patient taking differently: Take 0.5 mg by mouth 1 time daily as needed for Anxiety.) 20 Each 0   • oxycodone (OXY-IR) 15 MG immediate release tablet Take 30 mg by mouth 2 Times a Day.       No current facility-administered medications for this visit.          Allergies as of 10/12/2018 - Reviewed 10/12/2018   Allergen Reaction Noted   • Demerol Anaphylaxis 10/05/2011   • Food Vomiting 03/07/2013   • Morphine Anaphylaxis 10/05/2011   • Catapres [clonidine hcl] Hives 10/05/2011   • Sulfa drugs Hives and Vomiting 10/05/2011        ROS: Denies Chest pain, SOB, LE edema.    /64   Pulse 77   Resp 16   Ht 1.88 m (6' 2.02\")   SpO2 94%     Physical Exam:  Gen:         Alert and oriented, No apparent distress.  Neck:        No Lymphadenopathy or Bruits.  Lungs:     Clear to auscultation bilaterally  CV:          Regular rate and rhythm. No " murmurs, rubs or gallops.               Ext:          No clubbing, cyanosis, edema.      Assessment and Plan.   68 y.o. male with the following issues.    1. Convulsions, unspecified convulsion type (HCC)  Family calling the seizures however it does not make sense that he was doing so and again was completely aware and alert to swallow without aspirating and on post ictal.  That being said have referred to a new neurologist as of the do not want to continue seeing her current one.  - REFERRAL TO NEUROLOGY    2. Gait instability  Evaluation by neurology recommend a walker patient declines.  - REFERRAL TO NEUROLOGY    3. Syncope, unspecified syncope type  Have written for echocardiogram follow-up when complete  - EC-ECHOCARDIOGRAM COMPLETE W/O CONT; Future    4. Essential hypertension

## 2018-12-07 DIAGNOSIS — I10 ESSENTIAL HYPERTENSION: ICD-10-CM

## 2018-12-07 RX ORDER — AMLODIPINE BESYLATE 10 MG/1
TABLET ORAL
Qty: 90 TAB | Refills: 3 | Status: SHIPPED | OUTPATIENT
Start: 2018-12-07 | End: 2020-01-02

## 2019-01-03 DIAGNOSIS — E78.5 HYPERLIPIDEMIA, UNSPECIFIED HYPERLIPIDEMIA TYPE: ICD-10-CM

## 2019-01-03 RX ORDER — LOVASTATIN 40 MG/1
TABLET ORAL
Qty: 90 TAB | Refills: 3 | Status: SHIPPED | OUTPATIENT
Start: 2019-01-03 | End: 2020-01-02

## 2019-01-28 ENCOUNTER — TELEPHONE (OUTPATIENT)
Dept: MEDICAL GROUP | Facility: MEDICAL CENTER | Age: 69
End: 2019-01-28

## 2019-01-28 DIAGNOSIS — R56.9 CONVULSIONS, UNSPECIFIED CONVULSION TYPE (HCC): ICD-10-CM

## 2019-01-28 DIAGNOSIS — R20.2 PARESTHESIAS: ICD-10-CM

## 2019-01-28 NOTE — TELEPHONE ENCOUNTER
1. Caller Name: Leatha                                         Call Back Number: [phone]      Patient approves a detailed voicemail message: N/A    2. SPECIFIC Action To Be Taken: Referral pending, please sign.    3. Diagnosis/Clinical Reason for Request: Convulsions, Parethesias    4. Specialty & Provider Name/Lab/Imaging Location: Dr. Ho    5. Is appointment scheduled for requested order/referral: no    Patient was not informed they will receive a return phone call from the office ONLY if there are any questions before processing their request. Advised to call back if they haven't received a call from the referral department in 5 days.

## 2019-01-30 ENCOUNTER — APPOINTMENT (OUTPATIENT)
Dept: NEPHROLOGY | Facility: MEDICAL CENTER | Age: 69
End: 2019-01-30
Payer: MEDICARE

## 2019-02-13 ENCOUNTER — APPOINTMENT (OUTPATIENT)
Dept: NEPHROLOGY | Facility: MEDICAL CENTER | Age: 69
End: 2019-02-13
Payer: MEDICARE

## 2019-02-28 ENCOUNTER — HOSPITAL ENCOUNTER (OUTPATIENT)
Dept: LAB | Facility: MEDICAL CENTER | Age: 69
End: 2019-02-28
Attending: INTERNAL MEDICINE
Payer: MEDICARE

## 2019-02-28 LAB
ANION GAP SERPL CALC-SCNC: 9 MMOL/L (ref 0–11.9)
BUN SERPL-MCNC: 19 MG/DL (ref 8–22)
CALCIUM SERPL-MCNC: 9.3 MG/DL (ref 8.5–10.5)
CHLORIDE SERPL-SCNC: 107 MMOL/L (ref 96–112)
CO2 SERPL-SCNC: 25 MMOL/L (ref 20–33)
CREAT SERPL-MCNC: 1.55 MG/DL (ref 0.5–1.4)
CREAT UR-MCNC: 129.3 MG/DL
GLUCOSE SERPL-MCNC: 126 MG/DL (ref 65–99)
MICROALBUMIN UR-MCNC: <0.7 MG/DL
MICROALBUMIN/CREAT UR: NORMAL MG/G (ref 0–30)
POTASSIUM SERPL-SCNC: 4.5 MMOL/L (ref 3.6–5.5)
SODIUM SERPL-SCNC: 141 MMOL/L (ref 135–145)

## 2019-02-28 PROCEDURE — 82043 UR ALBUMIN QUANTITATIVE: CPT

## 2019-02-28 PROCEDURE — 80048 BASIC METABOLIC PNL TOTAL CA: CPT

## 2019-02-28 PROCEDURE — 82570 ASSAY OF URINE CREATININE: CPT

## 2019-02-28 PROCEDURE — 36415 COLL VENOUS BLD VENIPUNCTURE: CPT

## 2019-03-05 ENCOUNTER — APPOINTMENT (OUTPATIENT)
Dept: NEPHROLOGY | Facility: MEDICAL CENTER | Age: 69
End: 2019-03-05
Payer: MEDICARE

## 2019-06-10 ENCOUNTER — TELEPHONE (OUTPATIENT)
Dept: MEDICAL GROUP | Facility: MEDICAL CENTER | Age: 69
End: 2019-06-10

## 2019-06-11 NOTE — TELEPHONE ENCOUNTER
VOICEMAIL  1. Caller Name: Wilmer                      Call Back Number: [phone] (home) or [phone]    2. Message: Had an echo that was ordered by Dr. Gao but it kind of got pushed to the side. Wondering if they need a new referral?     3. Patient approves office to leave a detailed voicemail/MyChart message: yes    4. Called home phone and spoke with patient. Let him know the order won't  until 2019 and they should be able to call imaging dept to schedule. They have the number and will reschedule.

## 2019-06-26 ENCOUNTER — HOSPITAL ENCOUNTER (OUTPATIENT)
Dept: LAB | Facility: MEDICAL CENTER | Age: 69
End: 2019-06-26
Attending: NURSE PRACTITIONER
Payer: MEDICARE

## 2019-06-26 LAB
ALBUMIN SERPL BCP-MCNC: 4.4 G/DL (ref 3.2–4.9)
ALBUMIN/GLOB SERPL: 1.7 G/DL
ALP SERPL-CCNC: 40 U/L (ref 30–99)
ALT SERPL-CCNC: 27 U/L (ref 2–50)
ANION GAP SERPL CALC-SCNC: 8 MMOL/L (ref 0–11.9)
AST SERPL-CCNC: 33 U/L (ref 12–45)
BILIRUB SERPL-MCNC: 0.4 MG/DL (ref 0.1–1.5)
BUN SERPL-MCNC: 12 MG/DL (ref 8–22)
CALCIUM SERPL-MCNC: 9.2 MG/DL (ref 8.5–10.5)
CHLORIDE SERPL-SCNC: 109 MMOL/L (ref 96–112)
CO2 SERPL-SCNC: 22 MMOL/L (ref 20–33)
CREAT SERPL-MCNC: 1.27 MG/DL (ref 0.5–1.4)
CRP SERPL HS-MCNC: 0.1 MG/DL (ref 0–0.75)
ERYTHROCYTE [DISTWIDTH] IN BLOOD BY AUTOMATED COUNT: 45.1 FL (ref 35.9–50)
FASTING STATUS PATIENT QL REPORTED: NORMAL
GLOBULIN SER CALC-MCNC: 2.6 G/DL (ref 1.9–3.5)
GLUCOSE SERPL-MCNC: 158 MG/DL (ref 65–99)
HCT VFR BLD AUTO: 42.6 % (ref 42–52)
HGB BLD-MCNC: 14.1 G/DL (ref 14–18)
MCH RBC QN AUTO: 29.6 PG (ref 27–33)
MCHC RBC AUTO-ENTMCNC: 33.1 G/DL (ref 33.7–35.3)
MCV RBC AUTO: 89.5 FL (ref 81.4–97.8)
PLATELET # BLD AUTO: 123 K/UL (ref 164–446)
PMV BLD AUTO: 11.7 FL (ref 9–12.9)
POTASSIUM SERPL-SCNC: 3.9 MMOL/L (ref 3.6–5.5)
PROT SERPL-MCNC: 7 G/DL (ref 6–8.2)
RBC # BLD AUTO: 4.76 M/UL (ref 4.7–6.1)
SODIUM SERPL-SCNC: 139 MMOL/L (ref 135–145)
WBC # BLD AUTO: 4.2 K/UL (ref 4.8–10.8)

## 2019-06-26 PROCEDURE — 36415 COLL VENOUS BLD VENIPUNCTURE: CPT

## 2019-06-26 PROCEDURE — 85027 COMPLETE CBC AUTOMATED: CPT

## 2019-06-26 PROCEDURE — 86140 C-REACTIVE PROTEIN: CPT

## 2019-06-26 PROCEDURE — 80053 COMPREHEN METABOLIC PANEL: CPT

## 2019-06-29 ENCOUNTER — HOSPITAL ENCOUNTER (OUTPATIENT)
Facility: MEDICAL CENTER | Age: 69
End: 2019-06-29
Attending: OTOLARYNGOLOGY
Payer: MEDICARE

## 2019-07-07 ENCOUNTER — HOSPITAL ENCOUNTER (OUTPATIENT)
Facility: MEDICAL CENTER | Age: 69
End: 2019-07-07
Attending: NURSE PRACTITIONER
Payer: MEDICARE

## 2019-07-07 PROCEDURE — 87046 STOOL CULTR AEROBIC BACT EA: CPT

## 2019-07-07 PROCEDURE — 87493 C DIFF AMPLIFIED PROBE: CPT

## 2019-07-07 PROCEDURE — 89125 SPECIMEN FAT STAIN: CPT

## 2019-07-07 PROCEDURE — 87045 FECES CULTURE AEROBIC BACT: CPT

## 2019-07-07 PROCEDURE — 82272 OCCULT BLD FECES 1-3 TESTS: CPT

## 2019-07-07 PROCEDURE — 87328 CRYPTOSPORIDIUM AG IA: CPT

## 2019-07-07 PROCEDURE — 83993 ASSAY FOR CALPROTECTIN FECAL: CPT

## 2019-07-07 PROCEDURE — 83520 IMMUNOASSAY QUANT NOS NONAB: CPT

## 2019-07-07 PROCEDURE — 87329 GIARDIA AG IA: CPT

## 2019-07-07 PROCEDURE — 89055 LEUKOCYTE ASSESSMENT FECAL: CPT

## 2019-07-08 LAB
BODY FLD TYPE: NORMAL
C DIFF DNA SPEC QL NAA+PROBE: NEGATIVE
C DIFF TOX GENS STL QL NAA+PROBE: NEGATIVE
FAT FLD QL: NORMAL
G LAMBLIA+C PARVUM AG STL QL RAPID: NORMAL
HEMOCCULT STL QL: POSITIVE
SIGNIFICANT IND 70042: NORMAL
SITE SITE: NORMAL
SOURCE SOURCE: NORMAL
STOOL OTHER ELEMENTS 1951: NORMAL
WBC STL QL MICRO: NORMAL

## 2019-07-11 LAB
BACTERIA STL CULT: NORMAL
CALPROTECTIN STL-MCNT: <16 UG/G
ELASTASE PANC STL-MCNT: 28 UG/G
SIGNIFICANT IND 70042: NORMAL
SITE SITE: NORMAL
SOURCE SOURCE: NORMAL

## 2019-07-22 RX ORDER — ROPINIROLE 0.5 MG/1
TABLET, FILM COATED ORAL
Qty: 90 TAB | Refills: 0 | Status: SHIPPED | OUTPATIENT
Start: 2019-07-22 | End: 2019-09-01 | Stop reason: SDUPTHER

## 2019-07-26 ENCOUNTER — APPOINTMENT (OUTPATIENT)
Dept: CARDIOLOGY | Facility: MEDICAL CENTER | Age: 69
End: 2019-07-26
Attending: INTERNAL MEDICINE
Payer: MEDICARE

## 2019-07-29 ENCOUNTER — HOSPITAL ENCOUNTER (OUTPATIENT)
Dept: RADIOLOGY | Facility: MEDICAL CENTER | Age: 69
End: 2019-07-29
Attending: NURSE PRACTITIONER
Payer: MEDICARE

## 2019-07-29 DIAGNOSIS — R19.7 DIARRHEA OF PRESUMED INFECTIOUS ORIGIN: ICD-10-CM

## 2019-07-29 PROCEDURE — 76700 US EXAM ABDOM COMPLETE: CPT

## 2019-08-05 ENCOUNTER — HOSPITAL ENCOUNTER (OUTPATIENT)
Dept: CARDIOLOGY | Facility: MEDICAL CENTER | Age: 69
End: 2019-08-05
Attending: INTERNAL MEDICINE
Payer: MEDICARE

## 2019-08-05 DIAGNOSIS — R55 SYNCOPE, UNSPECIFIED SYNCOPE TYPE: ICD-10-CM

## 2019-08-05 PROCEDURE — 93306 TTE W/DOPPLER COMPLETE: CPT

## 2019-08-06 LAB
LV EJECT FRACT  99904: 60
LV EJECT FRACT MOD 2C 99903: 70.99
LV EJECT FRACT MOD 4C 99902: 50.48
LV EJECT FRACT MOD BP 99901: 57.97

## 2019-08-06 PROCEDURE — 93306 TTE W/DOPPLER COMPLETE: CPT | Mod: 26 | Performed by: INTERNAL MEDICINE

## 2019-09-02 RX ORDER — ROPINIROLE 0.5 MG/1
TABLET, FILM COATED ORAL
Qty: 90 TAB | Refills: 0 | Status: SHIPPED | OUTPATIENT
Start: 2019-09-02 | End: 2024-02-12

## 2019-09-23 RX ORDER — ROPINIROLE 0.5 MG/1
TABLET, FILM COATED ORAL
Qty: 90 TAB | Refills: 0 | Status: SHIPPED | OUTPATIENT
Start: 2019-09-23 | End: 2020-01-07

## 2019-09-26 RX ORDER — FENOFIBRATE 145 MG/1
TABLET, COATED ORAL
Qty: 90 TAB | Refills: 0 | Status: SHIPPED | OUTPATIENT
Start: 2019-09-26 | End: 2020-01-02

## 2019-10-06 ENCOUNTER — HOSPITAL ENCOUNTER (OUTPATIENT)
Facility: MEDICAL CENTER | Age: 69
End: 2019-10-06
Attending: INTERNAL MEDICINE
Payer: MEDICARE

## 2019-10-06 PROCEDURE — 87493 C DIFF AMPLIFIED PROBE: CPT

## 2019-10-06 PROCEDURE — 89055 LEUKOCYTE ASSESSMENT FECAL: CPT

## 2019-10-08 LAB
C DIFF DNA SPEC QL NAA+PROBE: NEGATIVE
C DIFF TOX GENS STL QL NAA+PROBE: NEGATIVE
WBC STL QL MICRO: NORMAL

## 2020-01-06 DIAGNOSIS — E78.5 HYPERLIPIDEMIA, UNSPECIFIED HYPERLIPIDEMIA TYPE: ICD-10-CM

## 2020-01-07 RX ORDER — FENOFIBRATE 145 MG/1
TABLET, COATED ORAL
Qty: 90 TAB | Refills: 0 | Status: SHIPPED | OUTPATIENT
Start: 2020-01-07 | End: 2020-08-27

## 2020-01-07 RX ORDER — LOVASTATIN 40 MG/1
TABLET ORAL
Qty: 90 TAB | Refills: 0 | Status: SHIPPED | OUTPATIENT
Start: 2020-01-07 | End: 2020-08-18

## 2020-01-07 RX ORDER — ROPINIROLE 0.5 MG/1
TABLET, FILM COATED ORAL
Qty: 90 TAB | Refills: 0 | Status: SHIPPED | OUTPATIENT
Start: 2020-01-07 | End: 2020-01-08

## 2020-02-12 ENCOUNTER — APPOINTMENT (OUTPATIENT)
Dept: NEPHROLOGY | Facility: MEDICAL CENTER | Age: 70
End: 2020-02-12
Payer: MEDICARE

## 2020-03-13 ENCOUNTER — OFFICE VISIT (OUTPATIENT)
Dept: MEDICAL GROUP | Facility: MEDICAL CENTER | Age: 70
End: 2020-03-13
Payer: MEDICARE

## 2020-03-13 VITALS
WEIGHT: 213 LBS | HEIGHT: 74 IN | BODY MASS INDEX: 27.34 KG/M2 | HEART RATE: 67 BPM | OXYGEN SATURATION: 95 % | TEMPERATURE: 97 F | SYSTOLIC BLOOD PRESSURE: 126 MMHG | DIASTOLIC BLOOD PRESSURE: 58 MMHG

## 2020-03-13 DIAGNOSIS — N18.30 STAGE 3 CHRONIC KIDNEY DISEASE: Chronic | ICD-10-CM

## 2020-03-13 DIAGNOSIS — Z85.820 HISTORY OF MALIGNANT MELANOMA OF SKIN: ICD-10-CM

## 2020-03-13 DIAGNOSIS — Z12.5 SCREENING PSA (PROSTATE SPECIFIC ANTIGEN): ICD-10-CM

## 2020-03-13 DIAGNOSIS — K52.9 CHRONIC DIARRHEA: ICD-10-CM

## 2020-03-13 DIAGNOSIS — Z00.00 MEDICARE ANNUAL WELLNESS VISIT, SUBSEQUENT: ICD-10-CM

## 2020-03-13 DIAGNOSIS — R73.02 IGT (IMPAIRED GLUCOSE TOLERANCE): ICD-10-CM

## 2020-03-13 DIAGNOSIS — Z23 NEED FOR VACCINATION: ICD-10-CM

## 2020-03-13 DIAGNOSIS — E78.5 DYSLIPIDEMIA: ICD-10-CM

## 2020-03-13 DIAGNOSIS — Z11.59 NEED FOR HEPATITIS C SCREENING TEST: ICD-10-CM

## 2020-03-13 DIAGNOSIS — I10 ESSENTIAL HYPERTENSION: ICD-10-CM

## 2020-03-13 DIAGNOSIS — G89.29 NECK PAIN, CHRONIC: Chronic | ICD-10-CM

## 2020-03-13 DIAGNOSIS — M54.2 NECK PAIN, CHRONIC: Chronic | ICD-10-CM

## 2020-03-13 PROCEDURE — 99213 OFFICE O/P EST LOW 20 MIN: CPT | Mod: 25 | Performed by: INTERNAL MEDICINE

## 2020-03-13 PROCEDURE — G0439 PPPS, SUBSEQ VISIT: HCPCS | Performed by: INTERNAL MEDICINE

## 2020-03-13 PROCEDURE — G0009 ADMIN PNEUMOCOCCAL VACCINE: HCPCS | Performed by: INTERNAL MEDICINE

## 2020-03-13 PROCEDURE — 90732 PPSV23 VACC 2 YRS+ SUBQ/IM: CPT | Performed by: INTERNAL MEDICINE

## 2020-03-13 ASSESSMENT — ENCOUNTER SYMPTOMS: GENERAL WELL-BEING: GOOD

## 2020-03-13 ASSESSMENT — ACTIVITIES OF DAILY LIVING (ADL): BATHING_REQUIRES_ASSISTANCE: 0

## 2020-03-13 ASSESSMENT — FIBROSIS 4 INDEX: FIB4 SCORE: 3.61

## 2020-03-13 ASSESSMENT — PATIENT HEALTH QUESTIONNAIRE - PHQ9: CLINICAL INTERPRETATION OF PHQ2 SCORE: 0

## 2020-03-13 NOTE — PROGRESS NOTES
CC: Diarrhea, follow-up blood sugars, due for wellness examination.    HPI:   Wilmer presents today with the following.      1. Medicare annual wellness visit, subsequent  Screenings performed below advance directives already on file    2. Chronic diarrhea  Resents reporting chronic and persistent diarrhea for quite some time.  Is not been seen in 2 years and still has symptomology.  He has been seen by gastroenterology multiple recommendations no improvement.  Denies any blood in stool no black stools.    3. IGT (impaired glucose tolerance)  Blood sugars elevated in the past has not had checked in some time he does not watch his diet closely.          Information for advance directives given to patient or instructed to bring in advance directives into to office to put in chart.      Depression Screening    Little interest or pleasure in doing things?  0 - not at all  Feeling down, depressed , or hopeless? 0 - not at all  Patient Health Questionnaire Score: 0     If depressive symptoms identified deferred to follow up visit unless specifically addressed in assessment and plan.    Interpretation of PHQ-9 Total Score   Score Severity   1-4 No Depression   5-9 Mild Depression   10-14 Moderate Depression   15-19 Moderately Severe Depression   20-27 Severe Depression    Screening for Cognitive Impairment    Three Minute Recall (village, kitchen, baby) 3/3    Kervin clock face with all 12 numbers and set the hands to show 10 past 10.  No Patient is blind  Cognitive concerns identified deferred for follow up unless specifically addressed in assessment and plan.    Fall Risk Assessment    Has the patient had two or more falls in the last year or any fall with injury in the last year?  No    Safety Assessment    Throw rugs on floor.  No  Handrails on all stairs.  Yes  Good lighting in all hallways.  Yes  Difficulty hearing.  No  Patient counseled about all safety risks that were identified.    Functional Assessment  ADLs    Are there any barriers preventing you from cooking for yourself or meeting nutritional needs?  No.    Are there any barriers preventing you from driving safely or obtaining transportation?  Yes.    Are there any barriers preventing you from using a telephone or calling for help?  No.    Are there any barriers preventing you from shopping?  No.    Are there any barriers preventing you from taking care of your own finances?  No.    Are there any barriers preventing you from managing your medications?  No.    Are there any barriers preventing you from showering, bathing or dressing yourself?  No.    Are you currently engaging in any exercise or physical activity?  No.     What is your perception of your health?  Good.      Health Maintenance Summary                HEPATITIS C SCREENING Overdue 1950     Annual Wellness Visit Overdue 7/11/2018      Done 7/10/2017 Visit Dx: Medicare annual wellness visit, initial    IMM PNEUMOCOCCAL VACCINE: 65+ Years Overdue 10/12/2018      Done 10/12/2017 Imm Admin: Pneumococcal Conjugate Vaccine (Prevnar/PCV-13)     Patient has more history with this topic...    IMM INFLUENZA Overdue 9/1/2019     IMM ZOSTER VACCINES Postponed 10/18/2029 Originally 1/29/2000. Insurance/Financial    COLONOSCOPY Next Due 10/17/2023      Done 10/17/2018      Patient has more history with this topic...          Patient Care Team:  Giovanni Gao M.D. as PCP - General (Internal Medicine)  Pradeep Steen M.D. as Consulting Physician (Anesthesia)  Maxwell Connell M.D. as Consulting Physician (Gastroenterology)  Fadi Najjar, M.D. as Consulting Physician (Nephrology)  Chetan Steen M.D. as Consulting Physician (Surgery)  Darren Molina M.D. as Consulting Physician (Dermatology)  Dony Isbell M.D. (Neurology)  Chetan Petersen M.D. as Consulting Physician (Neurosurgery)            Health Care Screening: Age-appropriate preventive services that Medicare covers were discussed today and  ordered as indicated and agreed upon by the patient, and as recommended by USPTF and ACIP.     Patient Active Problem List    Diagnosis Date Noted   • History of malignant melanoma of skin 07/10/2017   • Chronic nausea 01/15/2014   • IGT (impaired glucose tolerance) 12/03/2013   • Essential hypertension 10/05/2011   • Dyslipidemia 10/05/2011   • Neck pain, chronic 10/05/2011   • Chronic lower back pain 10/05/2011   • RLS (restless legs syndrome) 10/05/2011   • CKD (chronic kidney disease) 10/05/2011   • Blindness 10/05/2011       Current Outpatient Medications   Medication Sig Dispense Refill   • ROPINIRole (REQUIP) 0.5 MG Tab TAKE 1 TABLET BY MOUTH THREE TIMES DAILY 270 Tab 0   • fenofibrate (TRICOR) 145 MG Tab TAKE 1 TABLET BY MOUTH EVERY DAY 90 Tab 0   • lovastatin (MEVACOR) 40 MG tablet TAKE 1 TABLET BY MOUTH EVERY DAY 90 Tab 0   • amLODIPine (NORVASC) 10 MG Tab Take 1 Tab by mouth every day. This is the last refill before patient is seen. Please inform patient. 90 Tab 3   • metoprolol (LOPRESSOR) 25 MG Tab TAKE 1 TABLET BY MOUTH TWICE DAILY 180 Tab 0   • ROPINIRole (REQUIP) 0.5 MG Tab TAKE 1 TABLET BY MOUTH THREE TIMES DAILY 90 Tab 0   • PANTOPRAZOLE SODIUM PO Take  by mouth.     • topiramate (TOPAMAX) 100 MG Tab Take 1 Tab by mouth every day. 90 Tab 3   • tiagabine (GABITRIL) 4 MG TABS Take 4 mg by mouth 3 times a day.     • oxyCODONE HCl CR (OXYCONTIN) 60 MG T12A tablet Take 30 mg by mouth 4 times a day.     • Cholecalciferol (VITAMIN D-3) 5000 UNITS TABS Take  by mouth every day.     • Pregabalin (LYRICA PO) Take 150 mg by mouth 4 times a day.     • lorazepam (ATIVAN) 0.5 MG TABS Take 1 Tab by mouth every four hours as needed for Anxiety. (Patient taking differently: Take 0.5 mg by mouth 1 time daily as needed for Anxiety.) 20 Each 0   • oxycodone (OXY-IR) 15 MG immediate release tablet Take 30 mg by mouth 2 Times a Day.       No current facility-administered medications for this visit.        Family History    Problem Relation Age of Onset   • Stroke Father    • Cancer Mother         ovarian   • Diabetes Maternal Grandmother        Social History     Socioeconomic History   • Marital status:      Spouse name: Not on file   • Number of children: Not on file   • Years of education: Not on file   • Highest education level: Not on file   Occupational History   • Not on file   Social Needs   • Financial resource strain: Not on file   • Food insecurity     Worry: Not on file     Inability: Not on file   • Transportation needs     Medical: Not on file     Non-medical: Not on file   Tobacco Use   • Smoking status: Former Smoker     Packs/day: 1.00     Years: 10.00     Pack years: 10.00     Types: Cigarettes     Last attempt to quit: 2009     Years since quittin.2   • Smokeless tobacco: Never Used   • Tobacco comment: quit long time ago /used to smoke pack a day for 10 years   Substance and Sexual Activity   • Alcohol use: No     Alcohol/week: 0.0 oz   • Drug use: No   • Sexual activity: Never     Partners: Female     Comment:  lives with wife/blind so wife helps with most stuff   Lifestyle   • Physical activity     Days per week: Not on file     Minutes per session: Not on file   • Stress: Not on file   Relationships   • Social connections     Talks on phone: Not on file     Gets together: Not on file     Attends Evangelical service: Not on file     Active member of club or organization: Not on file     Attends meetings of clubs or organizations: Not on file     Relationship status: Not on file   • Intimate partner violence     Fear of current or ex partner: Not on file     Emotionally abused: Not on file     Physically abused: Not on file     Forced sexual activity: Not on file   Other Topics Concern   • Not on file   Social History Narrative   • Not on file       Past Surgical History:   Procedure Laterality Date   • OTHER  2016    Lipoma removed   • CHOLECYSTECTOMY  2015   • GASTROSCOPY WITH BIOPSY   "1/15/2014    Performed by Abhi Bowen M.D. at SURGERY [facility]   • EGD W/ENDOSCOPIC ULTRASOUND  1/15/2014    Performed by Abhi Bowen M.D. at [facility]   • ERCP W/SPHINCTEROTOMY/PAPILL.  1/15/2014    Performed by Abhi Bowen M.D. at [facility]   • OTHER ORTHOPEDIC SURGERY  2014    cervical and neck fusions   • OTHER  2014    Melanoma removed   • OTHER  2013    Lumbar Fusion   • OPEN REDUCTION  1976    right elbow   • ORCHIOPEXY CHILD  1961   • EYE SURGERY      multiple atleast 40 for gluacoma and retinal detachment   • HERNIA REPAIR      left inguinal hernia   • LAMINOTOMY      cervical and lumbar   • OTHER      laser for kidney stones   • OTHER ORTHOPEDIC SURGERY      carpal tunnel right wrist       Allergies as of 03/13/2020 - Reviewed 03/13/2020   Allergen Reaction Noted   • Demerol Anaphylaxis 10/05/2011   • Food Vomiting 03/07/2013   • Morphine Anaphylaxis 10/05/2011   • Catapres [clonidine hcl] Hives 10/05/2011   • Sulfa drugs Hives and Vomiting 10/05/2011        ROS: Denies Chest pain, SOB, LE edema.    /58 (BP Location: Left arm, Patient Position: Sitting)   Pulse 67   Temp 36.1 °C (97 °F)   Ht 1.88 m (6' 2\")   Wt 96.6 kg (213 lb)   SpO2 95%   BMI 27.35 kg/m²     Physical Exam:  Gen:         Alert and oriented, No apparent distress.  Neck:        No Lymphadenopathy or Bruits.  Lungs:     Clear to auscultation bilaterally  CV:          Regular rate and rhythm. No murmurs, rubs or gallops.  Abd:         Soft non tender, non distended. Normal active bowel sounds.  No  Hepatosplenomegaly, No pulsatile masses.                   Ext:          No clubbing, cyanosis, edema.      Assessment and Plan.   70 y.o. male with the following issues.    1. Medicare annual wellness visit, subsequent  Discussed healthy lifestyle habits as well as screening regimens.  Discussion about safe lifestyle practices, seatbelts, sunscreen, dietary recommendations.  - " Subsequent Annual Wellness Visit - Includes PPPS ()    2. Chronic diarrhea  Discussion about dietary surveillance referral back to gastroenterology  - REFERRAL TO GASTROENTEROLOGY    3. IGT (impaired glucose tolerance)  Question of diet exercise recheck A1c  - HEMOGLOBIN A1C; Future    4. History of malignant melanoma of skin  No recurrence per his reports.  - Subsequent Annual Wellness Visit - Includes PPPS ()    5. Essential hypertension  Currently well controlled, Discuss diet, exercise and salt restriction.  No change to medication therapy.  - Subsequent Annual Wellness Visit - Includes PPPS ()    6. Dyslipidemia  Recheck cholesterol  - Comp Metabolic Panel; Future  - Lipid Profile; Future  - Subsequent Annual Wellness Visit - Includes PPPS ()    7. Stage 3 chronic kidney disease (HCC)  Discussion about avoidance of nephrotoxins  - Subsequent Annual Wellness Visit - Includes PPPS ()    8. Neck pain, chronic  Follow with pain specialist  - Subsequent Annual Wellness Visit - Includes PPPS ()    9. Need for hepatitis C screening test    - HEP C VIRUS ANTIBODY; Future    10. Need for vaccination    - Pneumococal Polysaccharide Vaccine 23-Valent =>1YO SQ/IM    11. Screening PSA (prostate specific antigen)    - PROSTATE SPECIFIC AG SCREENING; Future        Referrals offered: Community-based lifestyle interventions to reduce health risks and promote self-management and wellness, fall prevention, nutrition, physical activity, tobacco-use cessation, weight loss, and mental health services as per orders if indicated.    Discussion today about general wellness and lifestyle habits:    · Prevent falls and reduce trip hazards; Cautioned about securing or removing rugs.  · Have a working fire alarm and carbon monoxide detector;   · Engage in regular physical activity and social activities

## 2020-05-20 RX ORDER — ROPINIROLE 0.5 MG/1
TABLET, FILM COATED ORAL
Qty: 270 TAB | Refills: 0 | Status: SHIPPED | OUTPATIENT
Start: 2020-05-20 | End: 2020-05-21

## 2020-05-21 RX ORDER — ROPINIROLE 0.5 MG/1
TABLET, FILM COATED ORAL
Qty: 90 TAB | Refills: 3 | Status: SHIPPED | OUTPATIENT
Start: 2020-05-21 | End: 2024-02-12

## 2020-05-26 ENCOUNTER — APPOINTMENT (OUTPATIENT)
Dept: NEPHROLOGY | Facility: MEDICAL CENTER | Age: 70
End: 2020-05-26
Payer: MEDICARE

## 2020-07-07 ENCOUNTER — OFFICE VISIT (OUTPATIENT)
Dept: NEPHROLOGY | Facility: MEDICAL CENTER | Age: 70
End: 2020-07-07
Payer: MEDICARE

## 2020-07-07 VITALS
WEIGHT: 212 LBS | HEART RATE: 59 BPM | BODY MASS INDEX: 27.21 KG/M2 | SYSTOLIC BLOOD PRESSURE: 118 MMHG | HEIGHT: 74 IN | OXYGEN SATURATION: 98 % | DIASTOLIC BLOOD PRESSURE: 60 MMHG | TEMPERATURE: 98 F

## 2020-07-07 DIAGNOSIS — I10 ESSENTIAL HYPERTENSION: ICD-10-CM

## 2020-07-07 DIAGNOSIS — N18.30 STAGE 3 CHRONIC KIDNEY DISEASE: Chronic | ICD-10-CM

## 2020-07-07 PROCEDURE — 99214 OFFICE O/P EST MOD 30 MIN: CPT | Performed by: INTERNAL MEDICINE

## 2020-07-07 ASSESSMENT — ENCOUNTER SYMPTOMS
FEVER: 0
SHORTNESS OF BREATH: 0
CHILLS: 0
COUGH: 0
NAUSEA: 0
VOMITING: 0
HYPERTENSION: 1

## 2020-07-07 ASSESSMENT — FIBROSIS 4 INDEX: FIB4 SCORE: 3.61

## 2020-07-07 NOTE — PROGRESS NOTES
"Subjective:      Wilmer Flores is a 70 y.o. male who presents with Chronic Kidney Disease and Hypertension            Chronic Kidney Disease   This is a chronic problem. The current episode started more than 1 year ago. The problem occurs constantly. The problem has been unchanged. Pertinent negatives include no chest pain, chills, coughing, fever, nausea, urinary symptoms or vomiting.   Hypertension   This is a chronic problem. The current episode started more than 1 year ago. The problem is unchanged. The problem is controlled. Pertinent negatives include no chest pain, malaise/fatigue, peripheral edema or shortness of breath. Risk factors for coronary artery disease include male gender and dyslipidemia. Past treatments include calcium channel blockers. The current treatment provides significant improvement. There are no compliance problems.  Hypertensive end-organ damage includes kidney disease. Identifiable causes of hypertension include chronic renal disease.       Review of Systems   Constitutional: Negative for chills, fever and malaise/fatigue.   Respiratory: Negative for cough and shortness of breath.    Cardiovascular: Negative for chest pain and leg swelling.   Gastrointestinal: Negative for nausea and vomiting.   Genitourinary: Negative for dysuria, frequency and urgency.          Objective:     /60 (BP Location: Left arm, Patient Position: Sitting, BP Cuff Size: Adult)   Pulse (!) 59   Temp 36.7 °C (98 °F) (Temporal)   Ht 1.88 m (6' 2\")   Wt 96.2 kg (212 lb)   SpO2 98%   BMI 27.22 kg/m²      Physical Exam  Vitals signs and nursing note reviewed.   Constitutional:       General: He is not in acute distress.     Appearance: He is not ill-appearing.   HENT:      Head: Normocephalic and atraumatic.      Right Ear: External ear normal.      Left Ear: External ear normal.      Nose: Nose normal.   Eyes:      General:         Right eye: No discharge.         Left eye: No discharge.      " "Conjunctiva/sclera: Conjunctivae normal.   Cardiovascular:      Rate and Rhythm: Normal rate and regular rhythm.      Heart sounds: No murmur.   Pulmonary:      Effort: Pulmonary effort is normal. No respiratory distress.      Breath sounds: Normal breath sounds. No wheezing.   Musculoskeletal:         General: No tenderness or deformity.      Right lower leg: No edema.      Left lower leg: No edema.   Skin:     General: Skin is warm.   Neurological:      General: No focal deficit present.      Mental Status: He is alert and oriented to person, place, and time.      Comments: Patient is blind   Psychiatric:         Mood and Affect: Mood normal.         Behavior: Behavior normal.       Past Medical History:   Diagnosis Date   • Acid reflux 12/29/2017   • Arthritis     Pt. unsure of this DX 12-29-17   • Blindness - both eyes 10/5/2011   • Cancer (HCC)     skin-melanoma 2012   • CATARACT 1996    HX OU-had surgery   • Chronic lower back pain 10/5/2011   • CKD (chronic kidney disease) 10/5/2011   • Dental disorder 12/28/2017    Upper full denture & lower partial at home   • Detached retina, bilateral     HX OF   • Glaucoma     HX OU   • Heart burn 12/29/2017   • High cholesterol 12/29/2017   • Hyperlipidemia 10/5/2011   • Hypertension 10/5/2011   • Hypertriglyceridemia 10/5/2011   • Kidney disease     has only left kidney   • Melanoma in situ of neck ([facility])    • Neck pain, chronic 10/5/2011   • Other specified disorder of intestines    • Pain 1/10/14    chronic pain in low back and neck; abdomen  8/10   • Pain 12/29/2017    \"Neck & Back pain\"   • Pneumonia     Pt. denies 12-29-17   • RLS (restless legs syndrome) 10/5/2011   • Seizure (HCC)     \"12-28-17 lasted one minute Ativan helped to stop it\".   • Unspecified urinary incontinence     Pt. denies this DX 12-29-17     Social History     Socioeconomic History   • Marital status:      Spouse name: Not on file   • Number of children: Not on file   • Years of " education: Not on file   • Highest education level: Not on file   Occupational History   • Not on file   Social Needs   • Financial resource strain: Not on file   • Food insecurity     Worry: Not on file     Inability: Not on file   • Transportation needs     Medical: Not on file     Non-medical: Not on file   Tobacco Use   • Smoking status: Former Smoker     Packs/day: 1.00     Years: 10.00     Pack years: 10.00     Types: Cigarettes     Last attempt to quit: 2009     Years since quittin.5   • Smokeless tobacco: Never Used   • Tobacco comment: quit long time ago /used to smoke pack a day for 10 years   Substance and Sexual Activity   • Alcohol use: No     Alcohol/week: 0.0 oz   • Drug use: No   • Sexual activity: Never     Partners: Female     Comment:  lives with wife/blind so wife helps with most stuff   Lifestyle   • Physical activity     Days per week: Not on file     Minutes per session: Not on file   • Stress: Not on file   Relationships   • Social connections     Talks on phone: Not on file     Gets together: Not on file     Attends Sikhism service: Not on file     Active member of club or organization: Not on file     Attends meetings of clubs or organizations: Not on file     Relationship status: Not on file   • Intimate partner violence     Fear of current or ex partner: Not on file     Emotionally abused: Not on file     Physically abused: Not on file     Forced sexual activity: Not on file   Other Topics Concern   • Not on file   Social History Narrative   • Not on file     Family History   Problem Relation Age of Onset   • Stroke Father    • Cancer Mother         ovarian   • Diabetes Maternal Grandmother      No results for input(s): HGB, ALBUMIN, HDL, TRIGLYCERIDE, SODIUM, POTASSIUM, CHLORIDE, CO2, BUN, CREATININE, PHOSPHORUS in the last 8544 hours.    Invalid input(s): CHOLESTEROL, LDL CLACULATED, URIC ACID, INTACT PTH, PRO CREAT RATIO            Assessment/Plan:       1. Essential  hypertension  Controlled  Continue same medication regimen  Continue low-sodium diet      2. Stage 3 chronic kidney disease (HCC)  No uremic symptoms  Renal dose of medication  Avoid nephrotoxins  Continue same medication regimen  No recent labs, patient was advised to check labs

## 2020-07-10 ENCOUNTER — HOSPITAL ENCOUNTER (OUTPATIENT)
Dept: LAB | Facility: MEDICAL CENTER | Age: 70
End: 2020-07-10
Attending: INTERNAL MEDICINE
Payer: MEDICARE

## 2020-07-10 DIAGNOSIS — Z11.59 NEED FOR HEPATITIS C SCREENING TEST: ICD-10-CM

## 2020-07-10 DIAGNOSIS — E78.5 DYSLIPIDEMIA: ICD-10-CM

## 2020-07-10 DIAGNOSIS — Z12.5 SCREENING PSA (PROSTATE SPECIFIC ANTIGEN): ICD-10-CM

## 2020-07-10 DIAGNOSIS — R73.02 IGT (IMPAIRED GLUCOSE TOLERANCE): ICD-10-CM

## 2020-07-10 LAB
ALBUMIN SERPL BCP-MCNC: 4.3 G/DL (ref 3.2–4.9)
ALBUMIN/GLOB SERPL: 1.4 G/DL
ALP SERPL-CCNC: 65 U/L (ref 30–99)
ALT SERPL-CCNC: 34 U/L (ref 2–50)
ANION GAP SERPL CALC-SCNC: 8 MMOL/L (ref 7–16)
AST SERPL-CCNC: 35 U/L (ref 12–45)
BILIRUB SERPL-MCNC: 0.3 MG/DL (ref 0.1–1.5)
BUN SERPL-MCNC: 17 MG/DL (ref 8–22)
CALCIUM SERPL-MCNC: 9.1 MG/DL (ref 8.5–10.5)
CHLORIDE SERPL-SCNC: 106 MMOL/L (ref 96–112)
CHOLEST SERPL-MCNC: 83 MG/DL (ref 100–199)
CO2 SERPL-SCNC: 22 MMOL/L (ref 20–33)
CREAT SERPL-MCNC: 1.12 MG/DL (ref 0.5–1.4)
GLOBULIN SER CALC-MCNC: 3 G/DL (ref 1.9–3.5)
GLUCOSE SERPL-MCNC: 98 MG/DL (ref 65–99)
HDLC SERPL-MCNC: 17 MG/DL
LDLC SERPL CALC-MCNC: 38 MG/DL
POTASSIUM SERPL-SCNC: 4.3 MMOL/L (ref 3.6–5.5)
PROT SERPL-MCNC: 7.3 G/DL (ref 6–8.2)
SODIUM SERPL-SCNC: 136 MMOL/L (ref 135–145)
TRIGL SERPL-MCNC: 138 MG/DL (ref 0–149)

## 2020-07-10 PROCEDURE — 80053 COMPREHEN METABOLIC PANEL: CPT

## 2020-07-10 PROCEDURE — 84153 ASSAY OF PSA TOTAL: CPT | Mod: GA

## 2020-07-10 PROCEDURE — 83036 HEMOGLOBIN GLYCOSYLATED A1C: CPT | Mod: GA

## 2020-07-10 PROCEDURE — 86803 HEPATITIS C AB TEST: CPT

## 2020-07-10 PROCEDURE — 36415 COLL VENOUS BLD VENIPUNCTURE: CPT | Mod: GA

## 2020-07-10 PROCEDURE — 80061 LIPID PANEL: CPT

## 2020-07-11 LAB
EST. AVERAGE GLUCOSE BLD GHB EST-MCNC: 143 MG/DL
HBA1C MFR BLD: 6.6 % (ref 0–5.6)
HCV AB SER QL: NORMAL
PSA SERPL-MCNC: 0.52 NG/ML (ref 0–4)

## 2020-07-15 RX ORDER — ONDANSETRON 4 MG/1
4 TABLET, FILM COATED ORAL EVERY 4 HOURS PRN
Qty: 20 TAB | Refills: 0 | Status: SHIPPED | OUTPATIENT
Start: 2020-07-15 | End: 2024-02-12

## 2020-08-17 DIAGNOSIS — E78.5 HYPERLIPIDEMIA, UNSPECIFIED HYPERLIPIDEMIA TYPE: ICD-10-CM

## 2020-08-18 RX ORDER — LOVASTATIN 40 MG/1
TABLET ORAL
Qty: 90 TAB | Refills: 0 | Status: SHIPPED | OUTPATIENT
Start: 2020-08-18 | End: 2020-08-27

## 2020-08-27 DIAGNOSIS — E78.5 HYPERLIPIDEMIA, UNSPECIFIED HYPERLIPIDEMIA TYPE: ICD-10-CM

## 2020-08-27 RX ORDER — LOVASTATIN 40 MG/1
TABLET ORAL
Qty: 90 TAB | Refills: 1 | Status: SHIPPED | OUTPATIENT
Start: 2020-08-27 | End: 2024-02-12

## 2020-08-27 RX ORDER — FENOFIBRATE 145 MG/1
TABLET, COATED ORAL
Qty: 90 TAB | Refills: 1 | Status: SHIPPED | OUTPATIENT
Start: 2020-08-27 | End: 2024-02-12

## 2020-09-11 ENCOUNTER — APPOINTMENT (OUTPATIENT)
Dept: RADIOLOGY | Facility: MEDICAL CENTER | Age: 70
End: 2020-09-11
Attending: PHYSICIAN ASSISTANT
Payer: MEDICARE

## 2020-09-22 ENCOUNTER — HOSPITAL ENCOUNTER (OUTPATIENT)
Dept: RADIOLOGY | Facility: MEDICAL CENTER | Age: 70
End: 2020-09-22
Attending: PHYSICIAN ASSISTANT
Payer: MEDICARE

## 2020-09-22 DIAGNOSIS — R26.89 OTHER ABNORMALITIES OF GAIT AND MOBILITY: ICD-10-CM

## 2020-09-22 DIAGNOSIS — M54.2 CERVICALGIA: ICD-10-CM

## 2020-09-22 DIAGNOSIS — M54.50 LOW BACK PAIN, UNSPECIFIED BACK PAIN LATERALITY, UNSPECIFIED CHRONICITY, UNSPECIFIED WHETHER SCIATICA PRESENT: ICD-10-CM

## 2020-09-22 PROCEDURE — 72148 MRI LUMBAR SPINE W/O DYE: CPT

## 2020-09-22 PROCEDURE — 72141 MRI NECK SPINE W/O DYE: CPT

## 2020-09-22 PROCEDURE — 72110 X-RAY EXAM L-2 SPINE 4/>VWS: CPT

## 2020-09-22 PROCEDURE — 72050 X-RAY EXAM NECK SPINE 4/5VWS: CPT

## 2020-10-02 ENCOUNTER — HOSPITAL ENCOUNTER (OUTPATIENT)
Dept: RADIOLOGY | Facility: MEDICAL CENTER | Age: 70
End: 2020-10-02
Attending: PHYSICIAN ASSISTANT
Payer: MEDICARE

## 2021-01-15 ENCOUNTER — APPOINTMENT (OUTPATIENT)
Dept: RADIOLOGY | Facility: MEDICAL CENTER | Age: 71
End: 2021-01-15
Attending: PHYSICIAN ASSISTANT
Payer: MEDICARE

## 2021-01-15 DIAGNOSIS — Z23 NEED FOR VACCINATION: ICD-10-CM

## 2021-02-01 ENCOUNTER — PATIENT MESSAGE (OUTPATIENT)
Dept: MEDICAL GROUP | Facility: MEDICAL CENTER | Age: 71
End: 2021-02-01

## 2021-02-01 DIAGNOSIS — I10 ESSENTIAL HYPERTENSION: ICD-10-CM

## 2021-02-01 RX ORDER — AMLODIPINE BESYLATE 10 MG/1
10 TABLET ORAL DAILY
Qty: 90 TAB | Refills: 3 | Status: CANCELLED | OUTPATIENT
Start: 2021-02-01

## 2021-02-01 NOTE — PATIENT COMMUNICATION
Received request via: Pharmacy    Was the patient seen in the last year in this department? Yes    Does the patient have an active prescription (recently filled or refills available) for medication(s) requested? No     Requested Prescriptions     Pending Prescriptions Disp Refills   • amLODIPine (NORVASC) 10 MG Tab 90 Tab 3     Sig: Take 1 Tab by mouth every day. This is the last refill before patient is seen. Please inform patient.

## 2021-02-06 ENCOUNTER — APPOINTMENT (OUTPATIENT)
Dept: RADIOLOGY | Facility: MEDICAL CENTER | Age: 71
End: 2021-02-06
Attending: EMERGENCY MEDICINE
Payer: MEDICARE

## 2021-02-06 ENCOUNTER — HOSPITAL ENCOUNTER (EMERGENCY)
Facility: MEDICAL CENTER | Age: 71
End: 2021-02-07
Attending: EMERGENCY MEDICINE
Payer: MEDICARE

## 2021-02-06 ENCOUNTER — APPOINTMENT (OUTPATIENT)
Dept: RADIOLOGY | Facility: MEDICAL CENTER | Age: 71
End: 2021-02-06
Payer: MEDICARE

## 2021-02-06 DIAGNOSIS — M62.838 MUSCLE SPASMS OF NECK: ICD-10-CM
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
## 2021-02-06 DIAGNOSIS — M54.9 CHRONIC BILATERAL BACK PAIN, UNSPECIFIED BACK LOCATION: ICD-10-CM

## 2021-02-06 DIAGNOSIS — G89.29 CHRONIC BILATERAL BACK PAIN, UNSPECIFIED BACK LOCATION: ICD-10-CM

## 2021-02-06 DIAGNOSIS — M54.9 ACUTE BILATERAL BACK PAIN, UNSPECIFIED BACK LOCATION: ICD-10-CM

## 2021-02-06 LAB
ABO GROUP BLD: NORMAL
ALBUMIN SERPL BCP-MCNC: 4.1 G/DL (ref 3.2–4.9)
ALBUMIN/GLOB SERPL: 1.5 G/DL
ALP SERPL-CCNC: 55 U/L (ref 30–99)
ALT SERPL-CCNC: 40 U/L (ref 2–50)
ANION GAP SERPL CALC-SCNC: 8 MMOL/L (ref 7–16)
APTT PPP: 29.7 SEC (ref 24.7–36)
AST SERPL-CCNC: 46 U/L (ref 12–45)
BILIRUB SERPL-MCNC: 0.4 MG/DL (ref 0.1–1.5)
BLD GP AB SCN SERPL QL: NORMAL
BUN SERPL-MCNC: 14 MG/DL (ref 8–22)
CALCIUM SERPL-MCNC: 9.5 MG/DL (ref 8.5–10.5)
CHLORIDE SERPL-SCNC: 107 MMOL/L (ref 96–112)
CK SERPL-CCNC: 406 U/L (ref 0–154)
CO2 SERPL-SCNC: 26 MMOL/L (ref 20–33)
CREAT SERPL-MCNC: 1.19 MG/DL (ref 0.5–1.4)
ERYTHROCYTE [DISTWIDTH] IN BLOOD BY AUTOMATED COUNT: 47.7 FL (ref 35.9–50)
ETHANOL BLD-MCNC: <10.1 MG/DL (ref 0–10)
GLOBULIN SER CALC-MCNC: 2.7 G/DL (ref 1.9–3.5)
GLUCOSE SERPL-MCNC: 115 MG/DL (ref 65–99)
HCT VFR BLD AUTO: 41.7 % (ref 42–52)
HGB BLD-MCNC: 13.4 G/DL (ref 14–18)
INR PPP: 1.24 (ref 0.87–1.13)
MAGNESIUM SERPL-MCNC: 2.1 MG/DL (ref 1.5–2.5)
MCH RBC QN AUTO: 29 PG (ref 27–33)
MCHC RBC AUTO-ENTMCNC: 32.1 G/DL (ref 33.7–35.3)
MCV RBC AUTO: 90.3 FL (ref 81.4–97.8)
PLATELET # BLD AUTO: 85 K/UL (ref 164–446)
PMV BLD AUTO: 12.3 FL (ref 9–12.9)
POTASSIUM SERPL-SCNC: 4.7 MMOL/L (ref 3.6–5.5)
PROT SERPL-MCNC: 6.8 G/DL (ref 6–8.2)
PROTHROMBIN TIME: 16 SEC (ref 12–14.6)
RBC # BLD AUTO: 4.62 M/UL (ref 4.7–6.1)
RH BLD: NORMAL
SODIUM SERPL-SCNC: 141 MMOL/L (ref 135–145)
WBC # BLD AUTO: 5.4 K/UL (ref 4.8–10.8)

## 2021-02-06 PROCEDURE — 305948 HCHG GREEN TRAUMA ACT PRE-NOTIFY NO CC

## 2021-02-06 PROCEDURE — 70450 CT HEAD/BRAIN W/O DYE: CPT

## 2021-02-06 PROCEDURE — 86850 RBC ANTIBODY SCREEN: CPT

## 2021-02-06 PROCEDURE — 71045 X-RAY EXAM CHEST 1 VIEW: CPT

## 2021-02-06 PROCEDURE — 72128 CT CHEST SPINE W/O DYE: CPT

## 2021-02-06 PROCEDURE — 96374 THER/PROPH/DIAG INJ IV PUSH: CPT

## 2021-02-06 PROCEDURE — 72125 CT NECK SPINE W/O DYE: CPT

## 2021-02-06 PROCEDURE — 85027 COMPLETE CBC AUTOMATED: CPT

## 2021-02-06 PROCEDURE — 85730 THROMBOPLASTIN TIME PARTIAL: CPT

## 2021-02-06 PROCEDURE — 82550 ASSAY OF CK (CPK): CPT

## 2021-02-06 PROCEDURE — 72131 CT LUMBAR SPINE W/O DYE: CPT

## 2021-02-06 PROCEDURE — 85610 PROTHROMBIN TIME: CPT

## 2021-02-06 PROCEDURE — 99285 EMERGENCY DEPT VISIT HI MDM: CPT

## 2021-02-06 PROCEDURE — 83735 ASSAY OF MAGNESIUM: CPT

## 2021-02-06 PROCEDURE — 71260 CT THORAX DX C+: CPT

## 2021-02-06 PROCEDURE — 80053 COMPREHEN METABOLIC PANEL: CPT

## 2021-02-06 PROCEDURE — 86900 BLOOD TYPING SEROLOGIC ABO: CPT

## 2021-02-06 PROCEDURE — 700111 HCHG RX REV CODE 636 W/ 250 OVERRIDE (IP): Performed by: EMERGENCY MEDICINE

## 2021-02-06 PROCEDURE — 86901 BLOOD TYPING SEROLOGIC RH(D): CPT

## 2021-02-06 PROCEDURE — 700117 HCHG RX CONTRAST REV CODE 255: Performed by: EMERGENCY MEDICINE

## 2021-02-06 PROCEDURE — 82077 ASSAY SPEC XCP UR&BREATH IA: CPT

## 2021-02-06 RX ADMIN — HYDROMORPHONE HYDROCHLORIDE 1 MG: 1 INJECTION, SOLUTION INTRAMUSCULAR; INTRAVENOUS; SUBCUTANEOUS at 21:12

## 2021-02-06 RX ADMIN — IOHEXOL 100 ML: 350 INJECTION, SOLUTION INTRAVENOUS at 21:24

## 2021-02-06 SDOH — HEALTH STABILITY: MENTAL HEALTH: HOW OFTEN DO YOU HAVE A DRINK CONTAINING ALCOHOL?: NEVER

## 2021-02-07 VITALS
HEIGHT: 74 IN | WEIGHT: 220 LBS | DIASTOLIC BLOOD PRESSURE: 57 MMHG | SYSTOLIC BLOOD PRESSURE: 110 MMHG | TEMPERATURE: 97.8 F | BODY MASS INDEX: 28.23 KG/M2 | OXYGEN SATURATION: 100 % | RESPIRATION RATE: 18 BRPM | HEART RATE: 77 BPM

## 2021-02-07 NOTE — ED NOTES
"Pt discharged home with wife. IV discontinued and gauze placed, pt in possession of belongings. Pt provided discharge education and information pertaining to medications and follow up appointments. Pt received copy of discharge instructions and verbalized understanding. /57   Pulse 77   Temp 36.6 °C (97.8 °F) (Temporal)   Resp 18   Ht 1.88 m (6' 2\")   Wt 99.8 kg (220 lb)   SpO2 100%   BMI 28.25 kg/m²     "
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
## 2021-02-08 DIAGNOSIS — Z23 NEED FOR VACCINATION: ICD-10-CM
